# Patient Record
Sex: MALE | Race: OTHER | NOT HISPANIC OR LATINO | Employment: FULL TIME | URBAN - METROPOLITAN AREA
[De-identification: names, ages, dates, MRNs, and addresses within clinical notes are randomized per-mention and may not be internally consistent; named-entity substitution may affect disease eponyms.]

---

## 2022-03-07 ENCOUNTER — OFFICE VISIT (OUTPATIENT)
Dept: NEUROLOGY | Facility: CLINIC | Age: 57
End: 2022-03-07
Payer: COMMERCIAL

## 2022-03-07 ENCOUNTER — DOCUMENTATION ONLY (OUTPATIENT)
Dept: NEUROLOGY | Facility: CLINIC | Age: 57
End: 2022-03-07
Payer: COMMERCIAL

## 2022-03-07 VITALS — SYSTOLIC BLOOD PRESSURE: 135 MMHG | HEART RATE: 89 BPM | DIASTOLIC BLOOD PRESSURE: 86 MMHG | WEIGHT: 186.5 LBS

## 2022-03-07 DIAGNOSIS — R06.83 SNORING: ICD-10-CM

## 2022-03-07 DIAGNOSIS — R51.9 MORNING HEADACHE: ICD-10-CM

## 2022-03-07 DIAGNOSIS — G35 MULTIPLE SCLEROSIS: Primary | ICD-10-CM

## 2022-03-07 DIAGNOSIS — Z79.899 ENCOUNTER FOR MONITORING IMMUNOMODULATING THERAPY: ICD-10-CM

## 2022-03-07 DIAGNOSIS — Z51.81 ENCOUNTER FOR MONITORING IMMUNOMODULATING THERAPY: ICD-10-CM

## 2022-03-07 PROCEDURE — 99205 PR OFFICE/OUTPT VISIT, NEW, LEVL V, 60-74 MIN: ICD-10-PCS | Mod: S$GLB,,, | Performed by: PSYCHIATRY & NEUROLOGY

## 2022-03-07 PROCEDURE — 3079F PR MOST RECENT DIASTOLIC BLOOD PRESSURE 80-89 MM HG: ICD-10-PCS | Mod: CPTII,S$GLB,, | Performed by: PSYCHIATRY & NEUROLOGY

## 2022-03-07 PROCEDURE — 99999 PR PBB SHADOW E&M-NEW PATIENT-LVL IV: CPT | Mod: PBBFAC,,, | Performed by: PSYCHIATRY & NEUROLOGY

## 2022-03-07 PROCEDURE — 99205 OFFICE O/P NEW HI 60 MIN: CPT | Mod: S$GLB,,, | Performed by: PSYCHIATRY & NEUROLOGY

## 2022-03-07 PROCEDURE — 3079F DIAST BP 80-89 MM HG: CPT | Mod: CPTII,S$GLB,, | Performed by: PSYCHIATRY & NEUROLOGY

## 2022-03-07 PROCEDURE — 99999 PR PBB SHADOW E&M-NEW PATIENT-LVL IV: ICD-10-PCS | Mod: PBBFAC,,, | Performed by: PSYCHIATRY & NEUROLOGY

## 2022-03-07 PROCEDURE — 3075F SYST BP GE 130 - 139MM HG: CPT | Mod: CPTII,S$GLB,, | Performed by: PSYCHIATRY & NEUROLOGY

## 2022-03-07 PROCEDURE — 3075F PR MOST RECENT SYSTOLIC BLOOD PRESS GE 130-139MM HG: ICD-10-PCS | Mod: CPTII,S$GLB,, | Performed by: PSYCHIATRY & NEUROLOGY

## 2022-03-07 NOTE — PROGRESS NOTES
I saw Albert Savage as a new patient today to establish care for multiple sclerosis.  He comes in to establish care and is referred by Self, Zackal.  He is accompanied by his wife.     History of Present Illness  The patient is a 56 y.o. RH male with MS, here to establish care.      Previously seen by Dr. Woods. Last MRI and labs was in 2019. He doesn't have records or disks on today. Previously on Copaxone, had difficulty getting his meds due a mix up with another patient. Was on Copaxone.    Diagnosed in 2015 at [facility]. Initially with speech difficulty, wife describes speech as garbled and wasn't making sense. He was also tripping over his toes, unclear which foot or if both feet. Initially thought to be stroke, sent to ER. He had a CT head, which did not show stroke. MRI of brain demonstrated lesions down brain and spinal cord. He did have spinal tap. He was started on Copaxone, but this was stopped around 2021 as above.    He says that he thinks that he had symptoms prior to MS diagnosis, but no clear discrete episodes of relapse.    No clear new episodes of relapses, but wife notes that his memory is of question. He keeps asking the same questions. Sometimes with walking difficulty and with difficulty writing (actually took drafting in school).     No family history of MS.  Father had AIDP  Nephew has narcolepsy    Takes vit D3 2000unit/daily and multivitamin     Review of systems:    Vertigo/Dizziness: yes, vertigo with car rides   Headaches: yes, typically in early morning, sometimes in the evening.   Visual Symptoms: Not recently, but prior to 8/2005 he had some vision loss for several days   Bladder Dysfunction: No    Bowel Dysfunction: No    Pain: none    Skin Breakdown: No    Cognitive: some cognitive dysfunction.   Dysphagia: No    Dysarthria: No    Hand Dysfunction: Yes - decreased dexterity in terms of writing.   Gait Disturbance: Yes - at times unstable and infrequent  near falls    Falls: No   Spasticity: No    Sensation changes: no   Mood Disorder: No    Fatigue: Yes - when it's hot outside    Sleep Disturbance: yes, snoring at night and also wakes up with neck pain   Tremors: No    Heat sensitivity: Yes - but cold > heat sensitive    Any un-met adaptive needs? No    Copay Assist? Yes - for DMT      Past Medical History:   Diagnosis Date    Childhood asthma without complication        No past surgical history on file.    No family history on file.    Social History     Tobacco Use    Smoking status: Never Smoker    Smokeless tobacco: Never Used   Substance Use Topics    Alcohol use: Never    Drug use: Never       Review of patient's allergies indicates:   Allergen Reactions    Aspirin        No current outpatient medications on file prior to visit.         Physical Exam    /86   Pulse 89   Wt 84.6 kg (186 lb 8.2 oz)       In general, the patient is well nourished.    25 foot timed walk: deferred    MENTAL STATUS: language is fluent, normal verbal comprehension, wife helps to add to historical data, attention is normal, patient is alert and oriented x 3, fund of knowlege is appropriate by vocabulary.     CRANIAL NERVE EXAM:  There is no internuclear ophthalmoplegia.  Extraocular muscles are intact. Pupils are equal, round, and reactive to light. No facial asymmetry. Facial sensation is intact bilaterally. There is no dysarthria. Uvula is midline, and palate moves symmetrically. Shoulder shrug intact bilaterlly. Tongue protrusion is midline. Hearing is grossly intact. Neck is supple.     MOTOR EXAM: Normal bulk and tone throughout UE and LE bilaterally.    ? Delt Bi Tri WE WF FDI HF KE KF ADF APF   Right 5 5 5 5 5 5 5 5 5 5 5   Left 5 5 5 5 5 5 5 5 5 5 5       REFLEXES: 2+ and symmetric throughout except 1+ at achilles    SENSORY EXAM: Normal to light touch throughout.    COORDINATION: No upper extremity ataxia    GAIT: Narrow based and stable. Can walk on  toes        IMAGING (personally reviewed):  No results found for this or any previous visit.    No results found for this or any previous visit.    No results found for this or any previous visit.    No results found for this or any previous visit.    No results found for this or any previous visit.    No results found for this or any previous visit.      LABS:  No results found for: WBC, HGB, HCT, MCV, PLT      Chemistry    No results found for: NA, K, CL, CO2, BUN, CREATININE, GLU No results found for: CALCIUM, ALKPHOS, AST, ALT, BILITOT, ESTGFRAFRICA, EGFRNONAA         No results found for: LABPROT, ALBUMIN          Diagnosis/Assessment/Plan:       1. Multiple sclerosis  · Assessment: previously diagnosed with MS based on MRI and CSF but no chart documentation or disks available on today  · Imaging: will obtain old imaging and order new monitoring MRIs on today  · Labs: prescreening med monitoring labs ordered on today  · Symptoms management:   · Cognitive complaints: consider neuropsych referral at future visit  · Morning headaches: concern for sleep apnea, referral to sleep medicine. Discussed that this is an independent risk factor for stroke. Referral for sleep medicine ordered on today        Time spent on this encounter: 80 minutes. This includes face to face time and non-face to face time preparing to see the patient (eg, review of tests), obtaining and/or reviewing separately obtained history, documenting clinical information in the electronic or other health record, independently interpreting results and communicating results to the patient/family/caregiver, or care coordinator.      F/u in 3 months    Delia Aguirre MD

## 2022-03-18 ENCOUNTER — HOSPITAL ENCOUNTER (OUTPATIENT)
Dept: RADIOLOGY | Facility: HOSPITAL | Age: 57
Discharge: HOME | End: 2022-03-18
Attending: PSYCHIATRY & NEUROLOGY
Payer: COMMERCIAL

## 2022-03-18 DIAGNOSIS — Z79.899 ENCOUNTER FOR MONITORING IMMUNOMODULATING THERAPY: ICD-10-CM

## 2022-03-18 DIAGNOSIS — Z51.81 ENCOUNTER FOR MONITORING IMMUNOMODULATING THERAPY: ICD-10-CM

## 2022-03-18 DIAGNOSIS — G35 MULTIPLE SCLEROSIS: ICD-10-CM

## 2022-03-18 PROCEDURE — 72156 MRI NECK SPINE W/O & W/DYE: CPT | Mod: TC

## 2022-03-18 PROCEDURE — 72156 MRI CERVICAL SPINE DEMYELINATING W W/O CONTRAST: ICD-10-PCS | Mod: 26,,, | Performed by: RADIOLOGY

## 2022-03-18 PROCEDURE — 72157 MRI CHEST SPINE W/O & W/DYE: CPT | Mod: TC

## 2022-03-18 PROCEDURE — 25500020 PHARM REV CODE 255: Performed by: PSYCHIATRY & NEUROLOGY

## 2022-03-18 PROCEDURE — 70553 MRI BRAIN DEMYELINATING W/ WO CONTRAST: ICD-10-PCS | Mod: 26,,, | Performed by: RADIOLOGY

## 2022-03-18 PROCEDURE — 72156 MRI NECK SPINE W/O & W/DYE: CPT | Mod: 26,,, | Performed by: RADIOLOGY

## 2022-03-18 PROCEDURE — A9585 GADOBUTROL INJECTION: HCPCS | Performed by: PSYCHIATRY & NEUROLOGY

## 2022-03-18 PROCEDURE — 70553 MRI BRAIN STEM W/O & W/DYE: CPT | Mod: 26,,, | Performed by: RADIOLOGY

## 2022-03-18 PROCEDURE — 70553 MRI BRAIN STEM W/O & W/DYE: CPT | Mod: TC

## 2022-03-18 RX ORDER — GADOBUTROL 604.72 MG/ML
9 INJECTION INTRAVENOUS
Status: COMPLETED | OUTPATIENT
Start: 2022-03-18 | End: 2022-03-18

## 2022-03-18 RX ADMIN — GADOBUTROL 9 ML: 604.72 INJECTION INTRAVENOUS at 01:03

## 2022-05-09 ENCOUNTER — PATIENT MESSAGE (OUTPATIENT)
Dept: NEUROLOGY | Facility: CLINIC | Age: 57
End: 2022-05-09
Payer: COMMERCIAL

## 2022-06-06 ENCOUNTER — OFFICE VISIT (OUTPATIENT)
Dept: NEUROLOGY | Facility: CLINIC | Age: 57
End: 2022-06-06
Payer: COMMERCIAL

## 2022-06-06 VITALS
SYSTOLIC BLOOD PRESSURE: 134 MMHG | DIASTOLIC BLOOD PRESSURE: 79 MMHG | TEMPERATURE: 98 F | HEART RATE: 82 BPM | WEIGHT: 188.69 LBS

## 2022-06-06 DIAGNOSIS — G35 MULTIPLE SCLEROSIS: Primary | ICD-10-CM

## 2022-06-06 PROCEDURE — 1159F PR MEDICATION LIST DOCUMENTED IN MEDICAL RECORD: ICD-10-PCS | Mod: CPTII,S$GLB,, | Performed by: PSYCHIATRY & NEUROLOGY

## 2022-06-06 PROCEDURE — 99215 PR OFFICE/OUTPT VISIT, EST, LEVL V, 40-54 MIN: ICD-10-PCS | Mod: S$GLB,,, | Performed by: PSYCHIATRY & NEUROLOGY

## 2022-06-06 PROCEDURE — 1159F MED LIST DOCD IN RCRD: CPT | Mod: CPTII,S$GLB,, | Performed by: PSYCHIATRY & NEUROLOGY

## 2022-06-06 PROCEDURE — 3078F PR MOST RECENT DIASTOLIC BLOOD PRESSURE < 80 MM HG: ICD-10-PCS | Mod: CPTII,S$GLB,, | Performed by: PSYCHIATRY & NEUROLOGY

## 2022-06-06 PROCEDURE — 3078F DIAST BP <80 MM HG: CPT | Mod: CPTII,S$GLB,, | Performed by: PSYCHIATRY & NEUROLOGY

## 2022-06-06 PROCEDURE — 3075F SYST BP GE 130 - 139MM HG: CPT | Mod: CPTII,S$GLB,, | Performed by: PSYCHIATRY & NEUROLOGY

## 2022-06-06 PROCEDURE — 3075F PR MOST RECENT SYSTOLIC BLOOD PRESS GE 130-139MM HG: ICD-10-PCS | Mod: CPTII,S$GLB,, | Performed by: PSYCHIATRY & NEUROLOGY

## 2022-06-06 PROCEDURE — 99215 OFFICE O/P EST HI 40 MIN: CPT | Mod: S$GLB,,, | Performed by: PSYCHIATRY & NEUROLOGY

## 2022-06-06 PROCEDURE — 99999 PR PBB SHADOW E&M-EST. PATIENT-LVL III: CPT | Mod: PBBFAC,,, | Performed by: PSYCHIATRY & NEUROLOGY

## 2022-06-06 PROCEDURE — 99999 PR PBB SHADOW E&M-EST. PATIENT-LVL III: ICD-10-PCS | Mod: PBBFAC,,, | Performed by: PSYCHIATRY & NEUROLOGY

## 2022-06-06 NOTE — PROGRESS NOTES
Subjective:          Patient ID: Albert Savage is a 56 y.o. male who presents today for a routine clinic visit for MS.      MS HPI:  · DMT: None, would like start Copaxone as no ISRs with past use  · Side effects from DMT? No  · Taking vitamin D3 as recommended? yes  · No new symptoms  · Discussed MRI's, labs and treatment options    Medications:  No current outpatient medications on file prior to visit.        SOCIAL HISTORY  Social History     Tobacco Use    Smoking status: Never Smoker    Smokeless tobacco: Never Used   Substance Use Topics    Alcohol use: Never    Drug use: Never       ROS:    No flowsheet data found.             Objective:        1. 25 foot timed walk:  Timed 25 Foot Walk: 6/6/2022   Did patient wear an AFO? No   Was assistive device used? No   Time for 25 Foot Walk (seconds) 5.83   Time for 25 Foot Walk (seconds) 5.93       2. 9 Hole Peg Test:  No flowsheet data found.    Neurologic Exam  MENTAL STATUS: grossly intact. Normal language and attention.   CRANIAL NERVE EXAM: Extraocular muscles are intact.  No facial asymmetry. Shoulder shrug normal b/l, There is no dysarthria.   MOTOR EXAM: Normal bulk and tone throughout UE and LE bilaterally. Rapid sequential movements are normal. Strength is 5/5 in all groups in the lower extremities and upper extremities.   GAIT: Narrow based, stable.      Imaging:     No results found for this or any previous visit.    No results found for this or any previous visit.    No results found for this or any previous visit.    Results for orders placed during the hospital encounter of 03/18/22    MRI Brain Demyelinating W W/O Contrast    Impression  Numerous white matter lesions as discussed above consistent with the history of multiple sclerosis.  No enhancing lesion.  Please note there is no prior study for comparison.      Electronically signed by: Denver Arnold  Date:    03/20/2022  Time:    10:30    Results for orders placed during the hospital  encounter of 03/18/22    MRI Cervical Spine Demyelinating W W/O Contrast    Impression  Focal lesions within the cord at C3-4, C5-6, and T1 levels consistent with demyelinating disease.  No cord expansion or enhancement.  Relatively mild degenerative changes cervical spine.      Electronically signed by: Denver Arnold  Date:    03/20/2022  Time:    10:37    Results for orders placed during the hospital encounter of 03/18/22    MRI Thoracic Spine Demyelinating W W/O Contrast    Impression  Focal lesions within the cord at C3-4, C5-6, and T1 levels consistent with demyelinating disease.  No cord expansion or enhancement.  Relatively mild degenerative changes cervical spine.      Electronically signed by: Denver Arnold  Date:    03/20/2022  Time:    10:37        Labs:     No results found for: WDIWOZST95JF  No results found for: JCVINDEX, JCVANTIBODY  Lab Results   Component Value Date    YX0YDZOX 67.4 03/18/2022    ABSOLUTECD3 1388 03/18/2022    ON9SRZSX 15.7 03/18/2022    ABSOLUTECD8 324 03/18/2022    EH4TGXAB 50.1 03/18/2022    ABSOLUTECD4 1033 03/18/2022    LABCD48 3.19 03/18/2022     Lab Results   Component Value Date    WBC 7.49 03/18/2022    RBC 5.24 03/18/2022    HGB 15.5 03/18/2022    HCT 45.9 03/18/2022    MCV 88 03/18/2022    MCH 29.6 03/18/2022    MCHC 33.8 03/18/2022    RDW 12.0 03/18/2022     03/18/2022    MPV 12.9 03/18/2022    GRAN 4.8 03/18/2022    GRAN 64.5 03/18/2022    LYMPH 1.8 03/18/2022    LYMPH 23.9 03/18/2022    MONO 0.5 03/18/2022    MONO 6.3 03/18/2022    EOS 0.3 03/18/2022    BASO 0.06 03/18/2022    EOSINOPHIL 4.0 03/18/2022    BASOPHIL 0.8 03/18/2022     Sodium   Date Value Ref Range Status   03/18/2022 140 136 - 145 mmol/L Final     Potassium   Date Value Ref Range Status   03/18/2022 4.0 3.5 - 5.1 mmol/L Final     Chloride   Date Value Ref Range Status   03/18/2022 103 95 - 110 mmol/L Final     CO2   Date Value Ref Range Status   03/18/2022 28 23 - 29 mmol/L Final     Glucose    Date Value Ref Range Status   03/18/2022 112 (H) 70 - 110 mg/dL Final     BUN   Date Value Ref Range Status   03/18/2022 12 6 - 20 mg/dL Final     Creatinine   Date Value Ref Range Status   03/18/2022 1.1 0.5 - 1.4 mg/dL Final     Calcium   Date Value Ref Range Status   03/18/2022 9.9 8.7 - 10.5 mg/dL Final     Total Protein   Date Value Ref Range Status   03/18/2022 7.9 6.0 - 8.4 g/dL Final     Albumin   Date Value Ref Range Status   03/18/2022 4.3 3.5 - 5.2 g/dL Final     Total Bilirubin   Date Value Ref Range Status   03/18/2022 0.5 0.1 - 1.0 mg/dL Final     Comment:     For infants and newborns, interpretation of results should be based  on gestational age, weight and in agreement with clinical  observations.    Premature Infant recommended reference ranges:  Up to 24 hours.............<8.0 mg/dL  Up to 48 hours............<12.0 mg/dL  3-5 days..................<15.0 mg/dL  6-29 days.................<15.0 mg/dL       Alkaline Phosphatase   Date Value Ref Range Status   03/18/2022 53 (L) 55 - 135 U/L Final     AST   Date Value Ref Range Status   03/18/2022 22 10 - 40 U/L Final     ALT   Date Value Ref Range Status   03/18/2022 34 10 - 44 U/L Final     Anion Gap   Date Value Ref Range Status   03/18/2022 9 8 - 16 mmol/L Final     eGFR if    Date Value Ref Range Status   03/18/2022 >60.0 >60 mL/min/1.73 m^2 Final     eGFR if non    Date Value Ref Range Status   03/18/2022 >60.0 >60 mL/min/1.73 m^2 Final     Comment:     Calculation used to obtain the estimated glomerular filtration  rate (eGFR) is the CKD-EPI equation.        No results found for: HEPBSAG, HEPBSAB, HEPBCAB        MS Impression and Plan:        RRMS.  MRI and clinical history based diagnosis.  Previously on Copaxone. After discussing other options, he like to restart this given risk aversion with other DMTs and aversion to more frequent labs required with other DMT's. Will start at this time.  MS counseling  given.    F/u in 3 months    Time spent on this encounter: 50 minutes. This includes face to face time and non-face to face time preparing to see the patient (eg, review of tests), obtaining and/or reviewing separately obtained history, documenting clinical information in the electronic or other health record, independently interpreting results and communicating results to the patient/family/caregiver, or care coordinator.      Problem List Items Addressed This Visit    None         Delia Aguirre MD

## 2022-06-08 DIAGNOSIS — G35 MULTIPLE SCLEROSIS: Primary | ICD-10-CM

## 2022-06-09 RX ORDER — GLATIRAMER 40 MG/ML
40 INJECTION, SOLUTION SUBCUTANEOUS
Qty: 12 ML | Refills: 5 | Status: SHIPPED | OUTPATIENT
Start: 2022-06-10 | End: 2022-10-07 | Stop reason: SDUPTHER

## 2022-06-22 ENCOUNTER — PATIENT MESSAGE (OUTPATIENT)
Dept: PHARMACY | Facility: CLINIC | Age: 57
End: 2022-06-22
Payer: COMMERCIAL

## 2022-06-22 ENCOUNTER — SPECIALTY PHARMACY (OUTPATIENT)
Dept: PHARMACY | Facility: CLINIC | Age: 57
End: 2022-06-22
Payer: COMMERCIAL

## 2022-06-22 NOTE — TELEPHONE ENCOUNTER
Glatiramer PA on file from 12/14/21 to 12/14/22  Case ID: [id number]    Patient locked into filling with [facility] Rx routed. Attempted to reach patient to inform him, but someone answered the phone then hung up. Simfinit message sent. Closing referral at [facility].

## 2022-08-17 ENCOUNTER — TELEPHONE (OUTPATIENT)
Dept: NEUROLOGY | Facility: CLINIC | Age: 57
End: 2022-08-17
Payer: COMMERCIAL

## 2022-10-10 ENCOUNTER — TELEPHONE (OUTPATIENT)
Dept: NEUROLOGY | Facility: CLINIC | Age: 57
End: 2022-10-10
Payer: COMMERCIAL

## 2022-10-10 ENCOUNTER — OCCUPATIONAL HEALTH (OUTPATIENT)
Dept: URGENT CARE | Facility: CLINIC | Age: 57
End: 2022-10-10
Payer: COMMERCIAL

## 2022-10-10 DIAGNOSIS — Z00.00 ENCOUNTER FOR PHYSICAL EXAMINATION: Primary | ICD-10-CM

## 2022-10-10 PROCEDURE — 80305 DRUG TEST PRSMV DIR OPT OBS: CPT | Mod: S$GLB,,, | Performed by: NURSE PRACTITIONER

## 2022-10-10 PROCEDURE — 80305 OOH COLLECTION ONLY DRUG SCREEN: ICD-10-PCS | Mod: S$GLB,,, | Performed by: NURSE PRACTITIONER

## 2022-10-10 PROCEDURE — 97750 LIFT TEST: ICD-10-PCS | Mod: S$GLB,,, | Performed by: NURSE PRACTITIONER

## 2022-10-10 PROCEDURE — 97750 PHYSICAL PERFORMANCE TEST: CPT | Mod: S$GLB,,, | Performed by: NURSE PRACTITIONER

## 2022-10-10 PROCEDURE — 99499 UNLISTED E&M SERVICE: CPT | Mod: S$GLB,,, | Performed by: NURSE PRACTITIONER

## 2022-10-10 PROCEDURE — 99499 PHYSICAL, BASIC COMPLEXITY: ICD-10-PCS | Mod: S$GLB,,, | Performed by: NURSE PRACTITIONER

## 2022-11-18 ENCOUNTER — TELEPHONE (OUTPATIENT)
Dept: NEUROLOGY | Facility: CLINIC | Age: 57
End: 2022-11-18
Payer: COMMERCIAL

## 2022-11-18 NOTE — TELEPHONE ENCOUNTER
----- Message from Kaley Florez MA sent at 11/14/2022  1:23 PM CST -----  Regarding: pt advice  Contact: pt   Patient called stated that [facility]  will be calling to update patient information, he stated that he is getting a new insurance card in the mail and do not have the ID cards on hand. He just wanted to notify the office.

## 2022-11-21 ENCOUNTER — TELEPHONE (OUTPATIENT)
Dept: NEUROLOGY | Facility: CLINIC | Age: 57
End: 2022-11-21
Payer: COMMERCIAL

## 2022-11-21 DIAGNOSIS — G35 MULTIPLE SCLEROSIS: ICD-10-CM

## 2022-11-21 NOTE — TELEPHONE ENCOUNTER
----- Message from Vani Mckeon sent at 11/21/2022  1:56 PM CST -----  Regarding: APPT  Contact: pt [phone]  Rx Refill/Request    Is this a Refill or New Rx:  refill    Rx Name and Strength:  glatiramer (COPAXONE, GLATOPA) 40 mg/mL injection 12 mL     Preferred Pharmacy with phone number:    [facility]     Communication Preference:    Additional Information: needs prior auth

## 2022-11-29 RX ORDER — GLATIRAMER 40 MG/ML
40 INJECTION, SOLUTION SUBCUTANEOUS
Qty: 12 ML | Refills: 5 | Status: SHIPPED | OUTPATIENT
Start: 2022-11-30 | End: 2023-05-15

## 2022-11-30 ENCOUNTER — SPECIALTY PHARMACY (OUTPATIENT)
Dept: PHARMACY | Facility: CLINIC | Age: 57
End: 2022-11-30
Payer: COMMERCIAL

## 2022-11-30 NOTE — TELEPHONE ENCOUNTER
Tyler HARRISON submitted 11/30/22  M Key: [id number]    Hello, this is Joe Galloway with [facility].  We are working on your prescription that your doctor has sent us. We will be working with your insurance to get this approved for you. We will be calling you along the way with updates on your medication.  If you have any questions, you can reach us at [phone].    Welcome call outcome: Patient/caregiver reached

## 2022-12-01 ENCOUNTER — PATIENT MESSAGE (OUTPATIENT)
Dept: PHARMACY | Facility: CLINIC | Age: 57
End: 2022-12-01
Payer: COMMERCIAL

## 2022-12-01 NOTE — TELEPHONE ENCOUNTER
Glatiramer PA approved 11/30/22 to 11/30/23  Request Reference Number: [id number]    Patient locked into filling with [facility] to provide phone number to schedule delivery ([phone]). Closing referral at [facility].

## 2023-01-06 ENCOUNTER — OCCUPATIONAL HEALTH (OUTPATIENT)
Dept: URGENT CARE | Facility: CLINIC | Age: 58
End: 2023-01-06

## 2023-01-06 DIAGNOSIS — Z02.83 ENCOUNTER FOR DRUG SCREENING: Primary | ICD-10-CM

## 2023-01-06 DIAGNOSIS — Z13.9 ENCOUNTER FOR SCREENING: Primary | ICD-10-CM

## 2023-01-06 LAB
BREATH ALCOHOL: 0
CTP QC/QA: YES
POC 10 PANEL DRUG SCREEN: ABNORMAL

## 2023-01-06 PROCEDURE — 82075 POCT BREATH ALCOHOL TEST: ICD-10-PCS | Mod: S$GLB,,, | Performed by: PHYSICIAN ASSISTANT

## 2023-01-06 PROCEDURE — 80305 DRUG TEST PRSMV DIR OPT OBS: CPT | Mod: S$GLB,,, | Performed by: PHYSICIAN ASSISTANT

## 2023-01-06 PROCEDURE — 80305 MEDTOX HAIR COLLECTION ONLY: ICD-10-PCS | Mod: S$GLB,,, | Performed by: PHYSICIAN ASSISTANT

## 2023-01-06 PROCEDURE — 82075 ASSAY OF BREATH ETHANOL: CPT | Mod: S$GLB,,, | Performed by: PHYSICIAN ASSISTANT

## 2023-01-06 PROCEDURE — 80305 OOH NON-DOT DRUG SCREEN: ICD-10-PCS | Mod: S$GLB,,, | Performed by: PHYSICIAN ASSISTANT

## 2023-03-01 ENCOUNTER — OFFICE VISIT (OUTPATIENT)
Dept: NEUROLOGY | Facility: CLINIC | Age: 58
End: 2023-03-01
Payer: COMMERCIAL

## 2023-03-01 VITALS
SYSTOLIC BLOOD PRESSURE: 135 MMHG | BODY MASS INDEX: 27.53 KG/M2 | HEIGHT: 69 IN | WEIGHT: 185.88 LBS | HEART RATE: 72 BPM | DIASTOLIC BLOOD PRESSURE: 89 MMHG

## 2023-03-01 DIAGNOSIS — G35 MULTIPLE SCLEROSIS: Primary | ICD-10-CM

## 2023-03-01 DIAGNOSIS — Z29.89 PROPHYLACTIC IMMUNOTHERAPY: ICD-10-CM

## 2023-03-01 DIAGNOSIS — Z71.89 COUNSELING REGARDING GOALS OF CARE: ICD-10-CM

## 2023-03-01 PROCEDURE — 3079F PR MOST RECENT DIASTOLIC BLOOD PRESSURE 80-89 MM HG: ICD-10-PCS | Mod: CPTII,S$GLB,, | Performed by: PSYCHIATRY & NEUROLOGY

## 2023-03-01 PROCEDURE — 99215 OFFICE O/P EST HI 40 MIN: CPT | Mod: S$GLB,,, | Performed by: PSYCHIATRY & NEUROLOGY

## 2023-03-01 PROCEDURE — 4010F PR ACE/ARB THEARPY RXD/TAKEN: ICD-10-PCS | Mod: CPTII,S$GLB,, | Performed by: PSYCHIATRY & NEUROLOGY

## 2023-03-01 PROCEDURE — 1160F RVW MEDS BY RX/DR IN RCRD: CPT | Mod: CPTII,S$GLB,, | Performed by: PSYCHIATRY & NEUROLOGY

## 2023-03-01 PROCEDURE — 99999 PR PBB SHADOW E&M-EST. PATIENT-LVL III: CPT | Mod: PBBFAC,,, | Performed by: PSYCHIATRY & NEUROLOGY

## 2023-03-01 PROCEDURE — 1159F PR MEDICATION LIST DOCUMENTED IN MEDICAL RECORD: ICD-10-PCS | Mod: CPTII,S$GLB,, | Performed by: PSYCHIATRY & NEUROLOGY

## 2023-03-01 PROCEDURE — 3075F PR MOST RECENT SYSTOLIC BLOOD PRESS GE 130-139MM HG: ICD-10-PCS | Mod: CPTII,S$GLB,, | Performed by: PSYCHIATRY & NEUROLOGY

## 2023-03-01 PROCEDURE — 99215 PR OFFICE/OUTPT VISIT, EST, LEVL V, 40-54 MIN: ICD-10-PCS | Mod: S$GLB,,, | Performed by: PSYCHIATRY & NEUROLOGY

## 2023-03-01 PROCEDURE — 3079F DIAST BP 80-89 MM HG: CPT | Mod: CPTII,S$GLB,, | Performed by: PSYCHIATRY & NEUROLOGY

## 2023-03-01 PROCEDURE — 3008F BODY MASS INDEX DOCD: CPT | Mod: CPTII,S$GLB,, | Performed by: PSYCHIATRY & NEUROLOGY

## 2023-03-01 PROCEDURE — 1159F MED LIST DOCD IN RCRD: CPT | Mod: CPTII,S$GLB,, | Performed by: PSYCHIATRY & NEUROLOGY

## 2023-03-01 PROCEDURE — 3075F SYST BP GE 130 - 139MM HG: CPT | Mod: CPTII,S$GLB,, | Performed by: PSYCHIATRY & NEUROLOGY

## 2023-03-01 PROCEDURE — 3008F PR BODY MASS INDEX (BMI) DOCUMENTED: ICD-10-PCS | Mod: CPTII,S$GLB,, | Performed by: PSYCHIATRY & NEUROLOGY

## 2023-03-01 PROCEDURE — 1160F PR REVIEW ALL MEDS BY PRESCRIBER/CLIN PHARMACIST DOCUMENTED: ICD-10-PCS | Mod: CPTII,S$GLB,, | Performed by: PSYCHIATRY & NEUROLOGY

## 2023-03-01 PROCEDURE — 4010F ACE/ARB THERAPY RXD/TAKEN: CPT | Mod: CPTII,S$GLB,, | Performed by: PSYCHIATRY & NEUROLOGY

## 2023-03-01 PROCEDURE — 99999 PR PBB SHADOW E&M-EST. PATIENT-LVL III: ICD-10-PCS | Mod: PBBFAC,,, | Performed by: PSYCHIATRY & NEUROLOGY

## 2023-03-01 NOTE — PROGRESS NOTES
Subjective:          Patient ID: Albert Savage Jr. is a 57 y.o. male who presents today for a routine clinic visit for MS.  He is accompanied by his wife.      MS HPI:  DMT: glatiramer acetate  Side effects from DMT? No   Taking vitamin D3 as recommended? Yes -  5,000 IU/day   Restarted Glatiramer only recently -- only been back on it two months or so.   Denies any pain.   Denies any vertigo or balance issues.   Denies any sense of relapse or progressive.   As for permanent disability, his handwriting has changed for the worse, and he has some slurred speech according to his wife.    No other symptoms of concern; bladder is okay;  no spasms;   He is getting copay assistance  Mood is irritable    Medications:  Current Outpatient Medications   Medication Sig    glatiramer (COPAXONE, GLATOPA) 40 mg/mL injection Inject 40 mg into the skin 3 (three) times a week.     No current facility-administered medications for this visit.       SOCIAL HISTORY  Social History     Tobacco Use    Smoking status: Never    Smokeless tobacco: Never   Substance Use Topics    Alcohol use: Never    Drug use: Never       Living arrangements - the patient lives with their spouse.  He has 3 grown stepchildren whom he does not live with   Works as a            Objective:        Timed 25 Foot Walk: 6/6/2022 3/1/2023   Did patient wear an AFO? No No   Was assistive device used? No No   Time for 25 Foot Walk (seconds) 5.83 5   Time for 25 Foot Walk (seconds) 5.93 -     Neurologic Exam  MENTAL STATUS: grossly intact  CRANIAL NERVE EXAM: There is no internuclear ophthalmoplegia. Extraocular   muscles are intact.  No facial   asymmetry.There is no dysarthria.   MOTOR EXAM: Normal bulk and tone throughout UE and LE bilaterally. Rapid sequential movements are normal. Strength is 5/5 in all groups   in the lower extremities and upper extremities.   REFLEXES: Symmetric and 2+ throughout in all 4 extremities.   SENSORY EXAM: Normal to  vibration t/o  COORDINATION: Normal finger-to-nose exam.   GAIT: Narrow based and stable.      Imaging:     No results found for this or any previous visit.      Results for orders placed during the hospital encounter of 03/18/22    MRI Brain Demyelinating W W/O Contrast    Impression  Numerous white matter lesions as discussed above consistent with the history of multiple sclerosis.  No enhancing lesion.  Please note there is no prior study for comparison.      Electronically signed by: Denver Arnold  Date:    03/20/2022  Time:    10:30    Results for orders placed during the hospital encounter of 03/18/22    MRI Cervical Spine Demyelinating W W/O Contrast    Impression  Focal lesions within the cord at C3-4, C5-6, and T1 levels consistent with demyelinating disease.  No cord expansion or enhancement.  Relatively mild degenerative changes cervical spine.      Electronically signed by: Denver Arnold  Date:    03/20/2022  Time:    10:37    Results for orders placed during the hospital encounter of 03/18/22    MRI Thoracic Spine Demyelinating W W/O Contrast    Impression  Focal lesions within the cord at C3-4, C5-6, and T1 levels consistent with demyelinating disease.  No cord expansion or enhancement.  Relatively mild degenerative changes cervical spine.      Electronically signed by: Denver Arnold  Date:    03/20/2022  Time:    10:37        Labs:     No results found for: [id number]  No results found for: JCVINDEX, JCVANTIBODY  Lab Results   Component Value Date    KD3TMJSU 67.4 03/18/2022    ABSOLUTECD3 1388 03/18/2022    MA4AUQXU 15.7 03/18/2022    ABSOLUTECD8 324 03/18/2022    KW6KLURM 50.1 03/18/2022    ABSOLUTECD4 1033 03/18/2022    LABCD48 3.19 03/18/2022     Lab Results   Component Value Date    WBC 7.49 03/18/2022    RBC 5.24 03/18/2022    HGB 15.5 03/18/2022    HCT 45.9 03/18/2022    MCV 88 03/18/2022    MCH 29.6 03/18/2022    MCHC 33.8 03/18/2022    RDW 12.0 03/18/2022     03/18/2022    MPV  12.9 03/18/2022    GRAN 4.8 03/18/2022    GRAN 64.5 03/18/2022    LYMPH 1.8 03/18/2022    LYMPH 23.9 03/18/2022    MONO 0.5 03/18/2022    MONO 6.3 03/18/2022    EOS 0.3 03/18/2022    BASO 0.06 03/18/2022    EOSINOPHIL 4.0 03/18/2022    BASOPHIL 0.8 03/18/2022     Sodium   Date Value Ref Range Status   03/18/2022 140 136 - 145 mmol/L Final     Potassium   Date Value Ref Range Status   03/18/2022 4.0 3.5 - 5.1 mmol/L Final     Chloride   Date Value Ref Range Status   03/18/2022 103 95 - 110 mmol/L Final     CO2   Date Value Ref Range Status   03/18/2022 28 23 - 29 mmol/L Final     Glucose   Date Value Ref Range Status   03/18/2022 112 (H) 70 - 110 mg/dL Final     BUN   Date Value Ref Range Status   03/18/2022 12 6 - 20 mg/dL Final     Creatinine   Date Value Ref Range Status   03/18/2022 1.1 0.5 - 1.4 mg/dL Final     Calcium   Date Value Ref Range Status   03/18/2022 9.9 8.7 - 10.5 mg/dL Final     Total Protein   Date Value Ref Range Status   03/18/2022 7.9 6.0 - 8.4 g/dL Final     Albumin   Date Value Ref Range Status   03/18/2022 4.3 3.5 - 5.2 g/dL Final     Total Bilirubin   Date Value Ref Range Status   03/18/2022 0.5 0.1 - 1.0 mg/dL Final     Comment:     For infants and newborns, interpretation of results should be based  on gestational age, weight and in agreement with clinical  observations.    Premature Infant recommended reference ranges:  Up to 24 hours.............<8.0 mg/dL  Up to 48 hours............<12.0 mg/dL  3-5 days..................<15.0 mg/dL  6-29 days.................<15.0 mg/dL       Alkaline Phosphatase   Date Value Ref Range Status   03/18/2022 53 (L) 55 - 135 U/L Final     AST   Date Value Ref Range Status   03/18/2022 22 10 - 40 U/L Final     ALT   Date Value Ref Range Status   03/18/2022 34 10 - 44 U/L Final     Anion Gap   Date Value Ref Range Status   03/18/2022 9 8 - 16 mmol/L Final     eGFR if    Date Value Ref Range Status   03/18/2022 >60.0 >60 mL/min/1.73 m^2 Final      eGFR if non    Date Value Ref Range Status   03/18/2022 >60.0 >60 mL/min/1.73 m^2 Final     Comment:     Calculation used to obtain the estimated glomerular filtration  rate (eGFR) is the CKD-EPI equation.        No results found for: HEPBSAG, HEPBSAB, HEPBCAB        MS Impression and Plan:     NEURO MULTIPLE SCLEROSIS IMPRESSION:   MS Status:     Clinical Progression:  Clinically Stable    MRI Progression:  N/A  Plan:     DMT:  No change in management    Symptom Management:  No change in symptom management     Pt is clinically stable  He does not like needles, but wishes to stay the course with GA for now  6 mo interval MRIs of brain and spine in July   F/u [facility] in July after MRIs         Problem List Items Addressed This Visit    None  Visit Diagnoses       Multiple sclerosis    -  Primary    Relevant Orders    MRI Brain Demyelinating W W/O Contrast    MRI Cervical Spine Demyelinating W W/O Contrast    Prophylactic immunotherapy        Counseling regarding goals of care                Kami Faith MD    I spent a total of 40 minutes on the day of the visit.This includes face to face time and non-face to face time preparing to see the patient (eg, review of tests), obtaining and/or reviewing separately obtained history, documenting clinical information in the electronic or other health record, independently interpreting results and communicating results to the patient/family/caregiver, or care coordinator.

## 2023-05-15 DIAGNOSIS — G35 MULTIPLE SCLEROSIS: ICD-10-CM

## 2023-05-15 RX ORDER — GLATIRAMER 40 MG/ML
INJECTION, SOLUTION SUBCUTANEOUS
Qty: 12 ML | Refills: 11 | Status: SHIPPED | OUTPATIENT
Start: 2023-05-15

## 2023-07-11 ENCOUNTER — PATIENT MESSAGE (OUTPATIENT)
Dept: NEUROLOGY | Facility: CLINIC | Age: 58
End: 2023-07-11
Payer: COMMERCIAL

## 2023-07-17 ENCOUNTER — HOSPITAL ENCOUNTER (OUTPATIENT)
Dept: RADIOLOGY | Facility: HOSPITAL | Age: 58
Discharge: HOME | End: 2023-07-17
Attending: PSYCHIATRY & NEUROLOGY
Payer: COMMERCIAL

## 2023-07-17 DIAGNOSIS — G35 MULTIPLE SCLEROSIS: ICD-10-CM

## 2023-07-17 PROCEDURE — 25500020 PHARM REV CODE 255: Performed by: PSYCHIATRY & NEUROLOGY

## 2023-07-17 PROCEDURE — 70553 MRI BRAIN STEM W/O & W/DYE: CPT | Mod: TC

## 2023-07-17 PROCEDURE — A9585 GADOBUTROL INJECTION: HCPCS | Performed by: PSYCHIATRY & NEUROLOGY

## 2023-07-17 PROCEDURE — 70553 MRI BRAIN STEM W/O & W/DYE: CPT | Mod: 26,,, | Performed by: RADIOLOGY

## 2023-07-17 PROCEDURE — 70553 MRI BRAIN DEMYELINATING W/ WO CONTRAST: ICD-10-PCS | Mod: 26,,, | Performed by: RADIOLOGY

## 2023-07-17 RX ORDER — GADOBUTROL 604.72 MG/ML
9 INJECTION INTRAVENOUS
Status: COMPLETED | OUTPATIENT
Start: 2023-07-17 | End: 2023-07-17

## 2023-07-17 RX ADMIN — GADOBUTROL 9 ML: 604.72 INJECTION INTRAVENOUS at 08:07

## 2023-07-18 ENCOUNTER — OFFICE VISIT (OUTPATIENT)
Dept: NEUROLOGY | Facility: CLINIC | Age: 58
End: 2023-07-18
Payer: COMMERCIAL

## 2023-07-18 VITALS
SYSTOLIC BLOOD PRESSURE: 126 MMHG | HEIGHT: 69 IN | HEART RATE: 73 BPM | WEIGHT: 185.5 LBS | DIASTOLIC BLOOD PRESSURE: 85 MMHG | BODY MASS INDEX: 27.47 KG/M2

## 2023-07-18 DIAGNOSIS — G35 MULTIPLE SCLEROSIS: Primary | ICD-10-CM

## 2023-07-18 DIAGNOSIS — Z71.89 COUNSELING REGARDING GOALS OF CARE: ICD-10-CM

## 2023-07-18 DIAGNOSIS — Z29.89 PROPHYLACTIC IMMUNOTHERAPY: ICD-10-CM

## 2023-07-18 PROCEDURE — 4010F ACE/ARB THERAPY RXD/TAKEN: CPT | Mod: CPTII,S$GLB,, | Performed by: CLINICAL NURSE SPECIALIST

## 2023-07-18 PROCEDURE — 3079F DIAST BP 80-89 MM HG: CPT | Mod: CPTII,S$GLB,, | Performed by: CLINICAL NURSE SPECIALIST

## 2023-07-18 PROCEDURE — 1159F MED LIST DOCD IN RCRD: CPT | Mod: CPTII,S$GLB,, | Performed by: CLINICAL NURSE SPECIALIST

## 2023-07-18 PROCEDURE — 3074F PR MOST RECENT SYSTOLIC BLOOD PRESSURE < 130 MM HG: ICD-10-PCS | Mod: CPTII,S$GLB,, | Performed by: CLINICAL NURSE SPECIALIST

## 2023-07-18 PROCEDURE — 4010F PR ACE/ARB THEARPY RXD/TAKEN: ICD-10-PCS | Mod: CPTII,S$GLB,, | Performed by: CLINICAL NURSE SPECIALIST

## 2023-07-18 PROCEDURE — 3079F PR MOST RECENT DIASTOLIC BLOOD PRESSURE 80-89 MM HG: ICD-10-PCS | Mod: CPTII,S$GLB,, | Performed by: CLINICAL NURSE SPECIALIST

## 2023-07-18 PROCEDURE — 3074F SYST BP LT 130 MM HG: CPT | Mod: CPTII,S$GLB,, | Performed by: CLINICAL NURSE SPECIALIST

## 2023-07-18 PROCEDURE — 99215 PR OFFICE/OUTPT VISIT, EST, LEVL V, 40-54 MIN: ICD-10-PCS | Mod: S$GLB,,, | Performed by: CLINICAL NURSE SPECIALIST

## 2023-07-18 PROCEDURE — 1159F PR MEDICATION LIST DOCUMENTED IN MEDICAL RECORD: ICD-10-PCS | Mod: CPTII,S$GLB,, | Performed by: CLINICAL NURSE SPECIALIST

## 2023-07-18 PROCEDURE — 3008F BODY MASS INDEX DOCD: CPT | Mod: CPTII,S$GLB,, | Performed by: CLINICAL NURSE SPECIALIST

## 2023-07-18 PROCEDURE — 3008F PR BODY MASS INDEX (BMI) DOCUMENTED: ICD-10-PCS | Mod: CPTII,S$GLB,, | Performed by: CLINICAL NURSE SPECIALIST

## 2023-07-18 PROCEDURE — 99999 PR PBB SHADOW E&M-EST. PATIENT-LVL III: CPT | Mod: PBBFAC,,, | Performed by: CLINICAL NURSE SPECIALIST

## 2023-07-18 PROCEDURE — 99999 PR PBB SHADOW E&M-EST. PATIENT-LVL III: ICD-10-PCS | Mod: PBBFAC,,, | Performed by: CLINICAL NURSE SPECIALIST

## 2023-07-18 PROCEDURE — 99215 OFFICE O/P EST HI 40 MIN: CPT | Mod: S$GLB,,, | Performed by: CLINICAL NURSE SPECIALIST

## 2023-07-18 NOTE — PROGRESS NOTES
Subjective:          Patient ID: Albert Savage Jr. is a 57 y.o. male who presents today for a routine clinic visit for MS.  He was last seen by Dr. Faith in March 2023. The history has been provided by the patient. He is accompanied by his wife.     MS HPI:  DMT: Copaxone   Side effects from DMT? Does not like needles, but tolerates the injections well.   Taking vitamin D3 as recommended? 6000 units daily     He denies any new or worsening MS symptoms or signs of relapse.   He works full-time as a . He has a fairly active job.   He continues to have some trouble with handwriting, but this does not impact work much. He has no issues typing.   He denies any recent infections.   He had his eyes checked yesterday.   He is more sensitive to cold than heat.   He has headaches at times.   He does not drink alcohol or smoke and drinks a lot of water.     Medications:  Current Outpatient Medications   Medication Sig    glatiramer (COPAXONE, GLATOPA) 40 mg/mL injection INJECT 40MG SUBCUTANEOUSLY THREE TIMES WEEKLY AT LEAST 48 HOURS  APART     SOCIAL HISTORY  Social History     Tobacco Use    Smoking status: Never    Smokeless tobacco: Never   Substance Use Topics    Alcohol use: Never    Drug use: Never       Living arrangements - the patient lives with his wife     ROS:  Fatigue: No issues   Sleep: Snores at times   Bladder: Had 1 bladder accident about 2 months ago; will monitor   Bowels: Regular   Spasms: none   Vision: has bifocals   Cognition: his wife reports that he repeats himself sometimes   Mood: feels down sometimes regarding diagnosis  Walking: he is off balance sometimes; he does not fall   Pain: none   Swallowing: none   Speech: this has been an issue all of his life; he stumbles over words sometimes   Copay Assist: does not pay OOP for injections   Parethesias:  no issues          Objective:        1. 25 foot timed walk:  Timed 25 Foot Walk: 3/1/2023 7/18/2023   Did patient wear an AFO?  No No   Was assistive device used? No No   Time for 25 Foot Walk (seconds) 5 5.1   Time for 25 Foot Walk (seconds) - 4.6       Neurologic Exam    MENTAL STATUS: grossly intact  CRANIAL NERVE EXAM: There is no internuclear ophthalmoplegia. Extraocular muscles are intact.  No facial asymmetry.There is no dysarthria.   MOTOR EXAM: Normal bulk and tone throughout UE and LE bilaterally. Rapid sequential movements are normal in UE and LE. Strength is 5/5 in all groups in the lower extremities and upper extremities.   REFLEXES: Symmetric and 2+ throughout in all 4 extremities.   SENSORY EXAM: Normal to vibration t/o  COORDINATION: Normal finger-to-nose exam and heel to shin.   GAIT: Narrow based and stable. He can walk on toes and heels. He is unable to hop on each foot. Tandem walking is relatively steady.      Imaging:         Results for orders placed during the hospital encounter of 07/17/23    MRI Brain Demyelinating W W/O Contrast    Impression  Brain appears stable from prior exam, again demonstrating findings compatible with the reported history of multiple sclerosis.  No new or enhancing lesions to indicate ongoing or active demyelination.    Electronically signed by resident: Hyun Tapia  Date:    07/17/2023  Time:    11:00    Electronically signed by: Baltazar Reeder MD  Date:    07/17/2023  Time:    12:00    Images reviewed with the patient.     Labs:     Lab Results   Component Value Date    ZO5DCNJY 67.4 03/18/2022    ABSOLUTECD3 1388 03/18/2022    QQ5QOWJM 15.7 03/18/2022    ABSOLUTECD8 324 03/18/2022    OT5KSIGB 50.1 03/18/2022    ABSOLUTECD4 1033 03/18/2022    LABCD48 3.19 03/18/2022     Lab Results   Component Value Date    WBC 7.49 03/18/2022    RBC 5.24 03/18/2022    HGB 15.5 03/18/2022    HCT 45.9 03/18/2022    MCV 88 03/18/2022    MCH 29.6 03/18/2022    MCHC 33.8 03/18/2022    RDW 12.0 03/18/2022     03/18/2022    MPV 12.9 03/18/2022    GRAN 4.8 03/18/2022    GRAN 64.5 03/18/2022    LYMPH  1.8 03/18/2022    LYMPH 23.9 03/18/2022    MONO 0.5 03/18/2022    MONO 6.3 03/18/2022    EOS 0.3 03/18/2022    BASO 0.06 03/18/2022    EOSINOPHIL 4.0 03/18/2022    BASOPHIL 0.8 03/18/2022     Sodium   Date Value Ref Range Status   03/18/2022 140 136 - 145 mmol/L Final     Potassium   Date Value Ref Range Status   03/18/2022 4.0 3.5 - 5.1 mmol/L Final     Chloride   Date Value Ref Range Status   03/18/2022 103 95 - 110 mmol/L Final     CO2   Date Value Ref Range Status   03/18/2022 28 23 - 29 mmol/L Final     Glucose   Date Value Ref Range Status   03/18/2022 112 (H) 70 - 110 mg/dL Final     BUN   Date Value Ref Range Status   03/18/2022 12 6 - 20 mg/dL Final     Creatinine   Date Value Ref Range Status   03/18/2022 1.1 0.5 - 1.4 mg/dL Final     Calcium   Date Value Ref Range Status   03/18/2022 9.9 8.7 - 10.5 mg/dL Final     Total Protein   Date Value Ref Range Status   03/18/2022 7.9 6.0 - 8.4 g/dL Final     Albumin   Date Value Ref Range Status   03/18/2022 4.3 3.5 - 5.2 g/dL Final     Total Bilirubin   Date Value Ref Range Status   03/18/2022 0.5 0.1 - 1.0 mg/dL Final     Comment:     For infants and newborns, interpretation of results should be based  on gestational age, weight and in agreement with clinical  observations.    Premature Infant recommended reference ranges:  Up to 24 hours.............<8.0 mg/dL  Up to 48 hours............<12.0 mg/dL  3-5 days..................<15.0 mg/dL  6-29 days.................<15.0 mg/dL       Alkaline Phosphatase   Date Value Ref Range Status   03/18/2022 53 (L) 55 - 135 U/L Final     AST   Date Value Ref Range Status   03/18/2022 22 10 - 40 U/L Final     ALT   Date Value Ref Range Status   03/18/2022 34 10 - 44 U/L Final     Anion Gap   Date Value Ref Range Status   03/18/2022 9 8 - 16 mmol/L Final     eGFR if    Date Value Ref Range Status   03/18/2022 >60.0 >60 mL/min/1.73 m^2 Final     eGFR if non    Date Value Ref Range Status    03/18/2022 >60.0 >60 mL/min/1.73 m^2 Final     Comment:     Calculation used to obtain the estimated glomerular filtration  rate (eGFR) is the CKD-EPI equation.          MS Impression and Plan:     NEURO MULTIPLE SCLEROSIS IMPRESSION:   MS Status:     Number of relapses in the past year?:  0    Clinical Progression:  Clinically Stable    MRI Progression:  Stable  Plan:     DMT:  No change in management    DMT comment:  Continue Copaxone and Vitamin D. Vitamin D level ordered today.        He will follow up with Dr. Faith in 6 months.     Total time spent with patient: 45 minutes   Total time spent on encounter: 57 minutes           JULIO CÉSAR Medellin, CNS

## 2023-08-19 ENCOUNTER — PATIENT MESSAGE (OUTPATIENT)
Dept: NEUROLOGY | Facility: CLINIC | Age: 58
End: 2023-08-19
Payer: COMMERCIAL

## 2023-08-19 DIAGNOSIS — N39.0 URINARY TRACT INFECTION WITHOUT HEMATURIA, SITE UNSPECIFIED: ICD-10-CM

## 2023-08-19 DIAGNOSIS — G35 MULTIPLE SCLEROSIS: Primary | ICD-10-CM

## 2023-08-19 NOTE — LETTER
Samir [facility]  [facility] [address]  [address]  [facility] [address]  Phone: [phone]     2023     Re: Albert WEAVER Jr Hussein (: 1965)                       To Whom It May Concern:     Mr. Savage is a patient under my care. Please excuse him from work on the following dates as he was/will be attending medically necessary appointments. Feel free to reach out at [phone] should you have questions.    2023 lab appointment    9/15/2023 follow up lab appointment    2024 office visit     Sincerely,          Leidy Fernandez, JULIO CÉSAR, CNS

## 2023-09-01 ENCOUNTER — LAB VISIT (OUTPATIENT)
Dept: LAB | Facility: HOSPITAL | Age: 58
End: 2023-09-01
Payer: COMMERCIAL

## 2023-09-01 DIAGNOSIS — G35 MULTIPLE SCLEROSIS: ICD-10-CM

## 2023-09-01 LAB
BACTERIA #/AREA URNS AUTO: ABNORMAL /HPF
BILIRUB UR QL STRIP: NEGATIVE
CLARITY UR REFRACT.AUTO: ABNORMAL
COLOR UR AUTO: YELLOW
GLUCOSE UR QL STRIP: NEGATIVE
HGB UR QL STRIP: NEGATIVE
KETONES UR QL STRIP: NEGATIVE
LEUKOCYTE ESTERASE UR QL STRIP: ABNORMAL
MICROSCOPIC COMMENT: ABNORMAL
NITRITE UR QL STRIP: NEGATIVE
PH UR STRIP: 5 [PH] (ref 5–8)
PROT UR QL STRIP: NEGATIVE
RBC #/AREA URNS AUTO: 7 /HPF (ref 0–4)
SP GR UR STRIP: 1.03 (ref 1–1.03)
SQUAMOUS #/AREA URNS AUTO: 0 /HPF
URN SPEC COLLECT METH UR: ABNORMAL
WBC #/AREA URNS AUTO: >100 /HPF (ref 0–5)

## 2023-09-01 PROCEDURE — 87086 URINE CULTURE/COLONY COUNT: CPT | Performed by: CLINICAL NURSE SPECIALIST

## 2023-09-01 PROCEDURE — 87088 URINE BACTERIA CULTURE: CPT | Performed by: CLINICAL NURSE SPECIALIST

## 2023-09-01 PROCEDURE — 81001 URINALYSIS AUTO W/SCOPE: CPT | Performed by: CLINICAL NURSE SPECIALIST

## 2023-09-01 PROCEDURE — 87186 SC STD MICRODIL/AGAR DIL: CPT | Performed by: CLINICAL NURSE SPECIALIST

## 2023-09-01 PROCEDURE — 87077 CULTURE AEROBIC IDENTIFY: CPT | Performed by: CLINICAL NURSE SPECIALIST

## 2023-09-01 RX ORDER — NITROFURANTOIN 25; 75 MG/1; MG/1
100 CAPSULE ORAL 2 TIMES DAILY
Qty: 14 CAPSULE | Refills: 0 | Status: SHIPPED | OUTPATIENT
Start: 2023-09-01 | End: 2023-09-08

## 2023-09-03 LAB — BACTERIA UR CULT: ABNORMAL

## 2023-09-09 ENCOUNTER — PATIENT MESSAGE (OUTPATIENT)
Dept: NEUROLOGY | Facility: CLINIC | Age: 58
End: 2023-09-09
Payer: COMMERCIAL

## 2023-09-09 DIAGNOSIS — N39.0 URINARY TRACT INFECTION WITHOUT HEMATURIA, SITE UNSPECIFIED: Primary | ICD-10-CM

## 2023-09-13 ENCOUNTER — PATIENT MESSAGE (OUTPATIENT)
Dept: NEUROLOGY | Facility: CLINIC | Age: 58
End: 2023-09-13
Payer: COMMERCIAL

## 2023-09-14 NOTE — TELEPHONE ENCOUNTER
Sent pt revised work absence letter and a message encouraging him to apply for intermittent leave through [facility] to protect his job.

## 2023-09-15 ENCOUNTER — LAB VISIT (OUTPATIENT)
Dept: LAB | Facility: HOSPITAL | Age: 58
End: 2023-09-15
Payer: COMMERCIAL

## 2023-09-15 DIAGNOSIS — N39.0 URINARY TRACT INFECTION WITHOUT HEMATURIA, SITE UNSPECIFIED: ICD-10-CM

## 2023-09-15 LAB
BILIRUB UR QL STRIP: NEGATIVE
CLARITY UR REFRACT.AUTO: CLEAR
COLOR UR AUTO: YELLOW
GLUCOSE UR QL STRIP: NEGATIVE
HGB UR QL STRIP: NEGATIVE
KETONES UR QL STRIP: NEGATIVE
LEUKOCYTE ESTERASE UR QL STRIP: NEGATIVE
NITRITE UR QL STRIP: NEGATIVE
PH UR STRIP: 5 [PH] (ref 5–8)
PROT UR QL STRIP: NEGATIVE
SP GR UR STRIP: 1.02 (ref 1–1.03)
URN SPEC COLLECT METH UR: NORMAL

## 2023-09-15 PROCEDURE — 81003 URINALYSIS AUTO W/O SCOPE: CPT | Performed by: CLINICAL NURSE SPECIALIST

## 2023-10-27 ENCOUNTER — TELEPHONE (OUTPATIENT)
Dept: NEUROLOGY | Facility: CLINIC | Age: 58
End: 2023-10-27
Payer: COMMERCIAL

## 2023-10-27 NOTE — TELEPHONE ENCOUNTER
KAILA telling pt to call us back to r/s his 1/16 appt with Dr. Faith. I will also send a message via portal.

## 2023-12-13 ENCOUNTER — TELEPHONE (OUTPATIENT)
Dept: NEUROLOGY | Facility: CLINIC | Age: 58
End: 2023-12-13
Payer: COMMERCIAL

## 2023-12-13 NOTE — TELEPHONE ENCOUNTER
FRANK KWON Key: [id number] - PA Case ID: [id number] - Rx #: [id number]    PA submitted via [facility] on 12/13 for glatiramer

## 2023-12-15 NOTE — TELEPHONE ENCOUNTER
Request Reference Number: [id number]. GLATIRAMER INJ 40MG/ML is approved through 12/13/2024. Your patient may now fill this prescription and it will be covered.

## 2024-01-11 ENCOUNTER — PATIENT MESSAGE (OUTPATIENT)
Dept: NEUROLOGY | Facility: CLINIC | Age: 59
End: 2024-01-11
Payer: COMMERCIAL

## 2024-01-12 ENCOUNTER — TELEPHONE (OUTPATIENT)
Dept: NEUROLOGY | Facility: CLINIC | Age: 59
End: 2024-01-12
Payer: COMMERCIAL

## 2024-01-12 NOTE — TELEPHONE ENCOUNTER
KAILA telling pt to call us back to discuss changing his appt with Dr. Faith on 1/16 to a virtual due to weather conditions. I will also send a message via portal.

## 2024-01-12 NOTE — TELEPHONE ENCOUNTER
Spoke to pt and asked him how he wants to proceed with his appt on 1/16. Pt stated he has insurance, but does not have the physical insurance card. He stated he also does not have his insurance on file so he does not want to have to pay out of pocket for the appt. Pt decided to cancel the appt. I attempted to r/s him, but he said he wanted to hold off on r/s right now until he gets his insurance figured out. I informed pt of his r/s options (in person wth BB in May, in person with AP in March, or virtual with AP sometime sooner than March). I also explained the process of a VV to pt. He stated he will call us back when he is ready to schedule.

## 2024-03-01 ENCOUNTER — LAB VISIT (OUTPATIENT)
Dept: LAB | Facility: HOSPITAL | Age: 59
End: 2024-03-01
Payer: MEDICAID

## 2024-03-01 ENCOUNTER — OFFICE VISIT (OUTPATIENT)
Dept: NEUROLOGY | Facility: CLINIC | Age: 59
End: 2024-03-01
Payer: MEDICAID

## 2024-03-01 VITALS
HEART RATE: 69 BPM | DIASTOLIC BLOOD PRESSURE: 80 MMHG | WEIGHT: 174.69 LBS | SYSTOLIC BLOOD PRESSURE: 125 MMHG | HEIGHT: 69 IN | BODY MASS INDEX: 25.87 KG/M2

## 2024-03-01 DIAGNOSIS — Z71.89 COUNSELING REGARDING GOALS OF CARE: ICD-10-CM

## 2024-03-01 DIAGNOSIS — G35 MULTIPLE SCLEROSIS: ICD-10-CM

## 2024-03-01 DIAGNOSIS — G35 MULTIPLE SCLEROSIS: Primary | ICD-10-CM

## 2024-03-01 DIAGNOSIS — Z29.89 PROPHYLACTIC IMMUNOTHERAPY: ICD-10-CM

## 2024-03-01 LAB — 25(OH)D3+25(OH)D2 SERPL-MCNC: 81 NG/ML (ref 30–96)

## 2024-03-01 PROCEDURE — 3008F BODY MASS INDEX DOCD: CPT | Mod: CPTII,,, | Performed by: CLINICAL NURSE SPECIALIST

## 2024-03-01 PROCEDURE — 4010F ACE/ARB THERAPY RXD/TAKEN: CPT | Mod: CPTII,,, | Performed by: CLINICAL NURSE SPECIALIST

## 2024-03-01 PROCEDURE — 36415 COLL VENOUS BLD VENIPUNCTURE: CPT | Performed by: CLINICAL NURSE SPECIALIST

## 2024-03-01 PROCEDURE — 99999 PR PBB SHADOW E&M-EST. PATIENT-LVL III: CPT | Mod: PBBFAC,,, | Performed by: CLINICAL NURSE SPECIALIST

## 2024-03-01 PROCEDURE — G2211 COMPLEX E/M VISIT ADD ON: HCPCS | Mod: S$PBB,,, | Performed by: CLINICAL NURSE SPECIALIST

## 2024-03-01 PROCEDURE — 1159F MED LIST DOCD IN RCRD: CPT | Mod: CPTII,,, | Performed by: CLINICAL NURSE SPECIALIST

## 2024-03-01 PROCEDURE — 3074F SYST BP LT 130 MM HG: CPT | Mod: CPTII,,, | Performed by: CLINICAL NURSE SPECIALIST

## 2024-03-01 PROCEDURE — 99214 OFFICE O/P EST MOD 30 MIN: CPT | Mod: S$PBB,,, | Performed by: CLINICAL NURSE SPECIALIST

## 2024-03-01 PROCEDURE — 3079F DIAST BP 80-89 MM HG: CPT | Mod: CPTII,,, | Performed by: CLINICAL NURSE SPECIALIST

## 2024-03-01 PROCEDURE — 82306 VITAMIN D 25 HYDROXY: CPT | Performed by: CLINICAL NURSE SPECIALIST

## 2024-03-01 PROCEDURE — 99213 OFFICE O/P EST LOW 20 MIN: CPT | Mod: PBBFAC | Performed by: CLINICAL NURSE SPECIALIST

## 2024-03-01 NOTE — LETTER
March 1, 2024      [facility] [address]  [address]  [facility] [address]  Phone: [phone]       Patient: Albert Savage   YOB: 1965  Date of Visit: 03/01/2024    To Whom It May Concern:    Albert Savage is a patient under my care for the treatment of a chronic neurological condition. One of his intermittent symptoms is urinary urgency. As such, I recommend that he be allowed to use the restroom nearest to him when needed. If you require any additional information, please contact me at [phone].     Sincerely,        JULIO CÉSAR Albarado, CNS

## 2024-03-01 NOTE — PROGRESS NOTES
"Subjective:          Patient ID: Albert Savage Jr. is a 58 y.o. male who presents today for a routine clinic visit for MS.  He was last seen in July. The history has been provided by the patient. He is accompanied by his wife.     MS HPI:  DMT: Copaxone; he does not like doing the injections, but he does not miss doses   Side effects from DMT? No, just pain from the needle   Taking vitamin D3 as recommended? Yes--Vit D 6000 units and multivitamin   He has been drinking mushroom matcha, and his wife reports he has more energy with this.   He has an active job--generally close to 10,000 steps per day.   He had a UTI recently and took antibiotics for this.   He denies any significant new symptoms, signs of relapse, or clinical worsening.     Medications:  Current Outpatient Medications   Medication Sig    glatiramer (COPAXONE, GLATOPA) 40 mg/mL injection INJECT 40MG SUBCUTANEOUSLY THREE TIMES WEEKLY AT LEAST 48 HOURS  APART     SOCIAL HISTORY  Social History     Tobacco Use    Smoking status: Never    Smokeless tobacco: Never   Substance Use Topics    Alcohol use: Never    Drug use: Never       Living arrangements - the patient lives with his wife    ROS:  Fatigue: He does not nap. His energy level is good.   Sleep: he sleeps well  Bladder: he has urgency at times; denies accidents; had a UTI in September  Bowels: regular  Spasms: no issues  Vision: just got a new pair of glasses with new Rx  Memory: his wife reports that his short-term memory is off on occasion; may repeat questions; long-term is ok  Mood: no depression  Walking/balance: no changes; certain times of day/fatigue might impact his walking   Hand function: no issues   Pain: denies pain  Speech/Swallowing: speech is "not the same"--but sounds like more of a word finding issue; stumbles over words at times   He denies any hospitalizations or ER visits.   He has a good support system.   He is employed. He has health insurance.              Objective:    "     1. 25 foot timed walk:      7/18/2023    12:01 AM 3/1/2024    12:01 AM   Timed 25 Foot Walk:   Did patient wear an AFO? No No   Was assistive device used? No No   Time for 25 Foot Walk (seconds) 5.1 4.6   Time for 25 Foot Walk (seconds) 4.6        Neurologic Exam    MENTAL STATUS: grossly intact  CRANIAL NERVE EXAM: There is no internuclear ophthalmoplegia. Extraocular muscles are intact.  No facial asymmetry.There is no dysarthria.   MOTOR EXAM: Normal bulk and tone throughout UE and LE bilaterally. Rapid sequential movements are normal in UE and LE. Strength is 5/5 in all groups in the lower extremities and upper extremities.   REFLEXES: Symmetric and 2+ throughout in all 4 extremities.   SENSORY EXAM: Normal to vibration t/o  COORDINATION: Normal finger-to-nose exam and heel to shin.   GAIT: Narrow based and stable. He can walk on toes and heels. He is unable to hop on each foot. Tandem walking is relatively steady.   Imaging:       Results for orders placed during the hospital encounter of 07/17/23    MRI Brain Demyelinating W W/O Contrast    Impression  Brain appears stable from prior exam, again demonstrating findings compatible with the reported history of multiple sclerosis.  No new or enhancing lesions to indicate ongoing or active demyelination.    Electronically signed by resident: Hyun Tapia  Date:    07/17/2023  Time:    11:00    Electronically signed by: Baltazar Reeder MD  Date:    07/17/2023  Time:    12:00        Labs:     Lab Results   Component Value Date    AO6XVUTO 67.4 03/18/2022    ABSOLUTECD3 1388 03/18/2022    TD7SIFDL 15.7 03/18/2022    ABSOLUTECD8 324 03/18/2022    TP9DGYXR 50.1 03/18/2022    ABSOLUTECD4 1033 03/18/2022    LABCD48 3.19 03/18/2022     Lab Results   Component Value Date    WBC 7.95 09/01/2023    RBC 5.11 09/01/2023    HGB 14.9 09/01/2023    HCT 44.8 09/01/2023    MCV 88 09/01/2023    MCH 29.2 09/01/2023    MCHC 33.3 09/01/2023    RDW 12.0 09/01/2023      09/01/2023    MPV 12.9 09/01/2023    GRAN 4.7 09/01/2023    GRAN 59.4 09/01/2023    LYMPH 2.1 09/01/2023    LYMPH 26.9 09/01/2023    MONO 0.6 09/01/2023    MONO 7.3 09/01/2023    EOS 0.4 09/01/2023    BASO 0.08 09/01/2023    EOSINOPHIL 5.0 09/01/2023    BASOPHIL 1.0 09/01/2023     Sodium   Date Value Ref Range Status   09/01/2023 141 136 - 145 mmol/L Final     Potassium   Date Value Ref Range Status   09/01/2023 4.1 3.5 - 5.1 mmol/L Final     Chloride   Date Value Ref Range Status   09/01/2023 106 95 - 110 mmol/L Final     CO2   Date Value Ref Range Status   09/01/2023 28 23 - 29 mmol/L Final     Glucose   Date Value Ref Range Status   09/01/2023 98 70 - 110 mg/dL Final     BUN   Date Value Ref Range Status   09/01/2023 16 6 - 20 mg/dL Final     Creatinine   Date Value Ref Range Status   09/01/2023 1.4 0.5 - 1.4 mg/dL Final     Calcium   Date Value Ref Range Status   09/01/2023 9.3 8.7 - 10.5 mg/dL Final     Total Protein   Date Value Ref Range Status   09/01/2023 7.6 6.0 - 8.4 g/dL Final     Albumin   Date Value Ref Range Status   09/01/2023 4.0 3.5 - 5.2 g/dL Final     Total Bilirubin   Date Value Ref Range Status   09/01/2023 0.4 0.1 - 1.0 mg/dL Final     Comment:     For infants and newborns, interpretation of results should be based  on gestational age, weight and in agreement with clinical  observations.    Premature Infant recommended reference ranges:  Up to 24 hours.............<8.0 mg/dL  Up to 48 hours............<12.0 mg/dL  3-5 days..................<15.0 mg/dL  6-29 days.................<15.0 mg/dL       Alkaline Phosphatase   Date Value Ref Range Status   09/01/2023 60 55 - 135 U/L Final     AST   Date Value Ref Range Status   09/01/2023 17 10 - 40 U/L Final     ALT   Date Value Ref Range Status   09/01/2023 27 10 - 44 U/L Final     Anion Gap   Date Value Ref Range Status   09/01/2023 7 (L) 8 - 16 mmol/L Final     eGFR if    Date Value Ref Range Status   03/18/2022 >60.0 >60  mL/min/1.73 m^2 Final     eGFR if non    Date Value Ref Range Status   03/18/2022 >60.0 >60 mL/min/1.73 m^2 Final     Comment:     Calculation used to obtain the estimated glomerular filtration  rate (eGFR) is the CKD-EPI equation.            MS Impression and Plan:     NEURO MULTIPLE SCLEROSIS IMPRESSION:   Number of relapses in the past year?:  0  Clinical Progression:  Clinically Stable  MS Classification:  Relapsing-Remitting MS  DMT:  No change in management  DMT comment:  Continue glatiramer and Vitamin D.     MRIs are planned for July 2024--brain and spine without contrast   Note provided to patient for employer to allow him to use the nearest restroom while at work.   He will follow up with Dr. Faith in 6 months. The visit today is associated with current or anticipated ongoing medical care related to this patient's single serious condition/complex condition of multiple sclerosis.    Total time spent with patient: 29 minutes   Total time spent on encounter: 36 minutes         JULIO CÉSAR Med[place], CNS    Problem List Items Addressed This Visit    None  Visit Diagnoses       Multiple sclerosis    -  Primary    Relevant Orders    MRI Cervical Spine Demyelinating Without Contrast    Vitamin D    MRI Brain Demyelinating Without Contrast    MRI Thoracic Spine Demyelinating Without Contrast

## 2024-03-05 ENCOUNTER — PATIENT MESSAGE (OUTPATIENT)
Dept: NEUROLOGY | Facility: CLINIC | Age: 59
End: 2024-03-05
Payer: MEDICAID

## 2024-04-16 ENCOUNTER — TELEPHONE (OUTPATIENT)
Dept: NEUROLOGY | Facility: CLINIC | Age: 59
End: 2024-04-16
Payer: MEDICAID

## 2024-04-16 DIAGNOSIS — G35 MULTIPLE SCLEROSIS: ICD-10-CM

## 2024-04-16 RX ORDER — GLATIRAMER 40 MG/ML
40 INJECTION, SOLUTION SUBCUTANEOUS
Qty: 36 ML | Refills: 1 | Status: ACTIVE | OUTPATIENT
Start: 2024-04-17

## 2024-04-16 NOTE — TELEPHONE ENCOUNTER
----- Message from Kourtney Salter RN sent at 4/16/2024 12:12 PM CDT -----  Regarding: FW: Refill  Contact: [phone]    ----- Message -----  From: Karen Cordon  Sent: 4/16/2024  10:35 AM CDT  To: Vianney WEAVER Staff  Subject: Refill                                           Rx Refill/Request    Is this a Refill or New Rx:  refill    Rx Name and Strength:  glatiramer (COPAXONE, GLATOPA) 40 mg/mL injection 12 mL    Preferred Pharmacy with phone number:    [facility] [address] [place] [address]  Phone: [phone] Fax: [phone]      Additional Information:Raina[facility] calling in request. Please call [phone]

## 2024-04-17 ENCOUNTER — PATIENT MESSAGE (OUTPATIENT)
Dept: NEUROLOGY | Facility: CLINIC | Age: 59
End: 2024-04-17
Payer: MEDICAID

## 2024-07-08 ENCOUNTER — HOSPITAL ENCOUNTER (OUTPATIENT)
Dept: RADIOLOGY | Facility: HOSPITAL | Age: 59
Discharge: HOME | End: 2024-07-08
Attending: CLINICAL NURSE SPECIALIST
Payer: MEDICAID

## 2024-07-08 DIAGNOSIS — G35 MULTIPLE SCLEROSIS: ICD-10-CM

## 2024-07-08 PROCEDURE — 72141 MRI NECK SPINE W/O DYE: CPT | Mod: 26,,, | Performed by: RADIOLOGY

## 2024-07-08 PROCEDURE — 72146 MRI CHEST SPINE W/O DYE: CPT | Mod: 26,,, | Performed by: RADIOLOGY

## 2024-07-08 PROCEDURE — 70551 MRI BRAIN STEM W/O DYE: CPT | Mod: 26,,, | Performed by: RADIOLOGY

## 2024-07-08 PROCEDURE — 72146 MRI CHEST SPINE W/O DYE: CPT | Mod: TC

## 2024-07-08 PROCEDURE — 70551 MRI BRAIN STEM W/O DYE: CPT | Mod: TC

## 2024-07-08 PROCEDURE — 72141 MRI NECK SPINE W/O DYE: CPT | Mod: TC

## 2024-09-09 DIAGNOSIS — G35 MULTIPLE SCLEROSIS: ICD-10-CM

## 2024-09-09 RX ORDER — GLATIRAMER 40 MG/ML
INJECTION, SOLUTION SUBCUTANEOUS
Qty: 36 ML | Refills: 1 | Status: SHIPPED | OUTPATIENT
Start: 2024-09-09

## 2024-10-15 NOTE — TELEPHONE ENCOUNTER
Albert Savage (Key: [id number])  PA Case ID #: [id number]    PA for glatiramer submitted via [facility]

## 2025-02-11 DIAGNOSIS — G35 MULTIPLE SCLEROSIS: ICD-10-CM

## 2025-02-11 RX ORDER — GLATIRAMER 40 MG/ML
40 INJECTION, SOLUTION SUBCUTANEOUS
Qty: 36 ML | Refills: 1 | Status: SHIPPED | OUTPATIENT
Start: 2025-02-12

## 2025-02-12 ENCOUNTER — TELEPHONE (OUTPATIENT)
Dept: NEUROLOGY | Facility: CLINIC | Age: 60
End: 2025-02-12
Payer: MEDICAID

## 2025-02-12 NOTE — TELEPHONE ENCOUNTER
Spoke with pt in regards to getting pt scheduled for a f/u with BB, pt states he will have to call the office back to schedule do to job schedule.

## 2025-04-25 NOTE — PROGRESS NOTES
"Subjective:       Patient ID: Albert Savage Jr. is a 59 y.o. male who presents today for a {Blank single:10304::"routine","fit-in"} clinic visit for MS.      MS HPI:  History of Present Illness              DMT: ***  Side effects from DMT? {YES **/NO:26012}  Taking vitamin D3 as recommended? {YES **/NO:98459} Dose:   ***    SOCIAL HISTORY  Social History[1]  Living arrangements - the patient {lives with:798590}.  Employment ***    MS ROS:  Fatigue: {YES **/NO:24385}  Sleep Disturbance: {YES **/NO:75998}  Bladder Dysfunction: {YES **/NO:27270}  Bowel Dysfunction: {YES **/NO:39168}  Spasticity: {YES **/NO:83755}  Visual Symptoms: {YES **/NO:43747}  Cognitive: {YES **/NO:82581}  Mood Disorder: {YES **/NO:75142}  Gait Disturbance: {YES **/NO:05443}  Falls: {YES **/NO:08268}  Hand Dysfunction: {YES **/NO:75902}  Pain: {YES **/NO:84156}  Sexual Dysfunction: Not Assessed  Skin Breakdown: {YES **/NO:58169}  Tremors: {YES **/NO:91801}  Dysphagia:  {YES **/NO:49913}  Dysarthria:  {YES **/NO:18349}  Heat sensitivity:  {YES **/NO:81251}  Any un-met adaptive needs? {YES **/NO:24047}  Copay Assist?  {YES **/NO:28934}  Clinical Trial candidate? {YES **/NO:75355}            Objective:        1. 25 foot timed walk:***      7/18/2023    10:00 AM 3/1/2024     9:30 AM   Timed 25 Foot Walk:   Did patient wear an AFO? No No   Was assistive device used? No No   Time for 25 Foot Walk (seconds) 5.1 4.6   Time for 25 Foot Walk (seconds) 4.6      2. SDMT:***  3. 9 Hole Peg Test:***       No data to display                Neurological Exam          Imaging:     Results for orders placed during the hospital encounter of 07/08/24    MRI Brain Demyelinating Without Contrast    Impression  Brain and spinal cord appears stable from prior exam, again demonstrating findings compatible with the reported history of multiple sclerosis. No new discrete lesions to indicate ongoing demyelination.    Mild cervical and thoracic spondylosis without new " large focal disc herniation or cord compression.    Electronically signed by resident: Wilman Pittman  Date:    07/08/2024  Time:    09:58    Electronically signed by: Baltazar Reeder MD  Date:    07/08/2024  Time:    11:39    Results for orders placed during the hospital encounter of 07/08/24    MRI Cervical Spine Demyelinating Without Contrast    Impression  Brain and spinal cord appears stable from prior exam, again demonstrating findings compatible with the reported history of multiple sclerosis. No new discrete lesions to indicate ongoing demyelination.    Mild cervical and thoracic spondylosis without new large focal disc herniation or cord compression.    Electronically signed by resident: Wilman Pittman  Date:    07/08/2024  Time:    09:58    Electronically signed by: Baltazar Reeder MD  Date:    07/08/2024  Time:    11:39    Results for orders placed during the hospital encounter of 07/08/24    MRI Thoracic Spine Demyelinating Without Contrast    Impression  Brain and spinal cord appears stable from prior exam, again demonstrating findings compatible with the reported history of multiple sclerosis. No new discrete lesions to indicate ongoing demyelination.    Mild cervical and thoracic spondylosis without new large focal disc herniation or cord compression.    Electronically signed by resident: Wilman Pittman  Date:    07/08/2024  Time:    09:58    Electronically signed by: Baltazar Reeder MD  Date:    07/08/2024  Time:    11:39    Results for orders placed during the hospital encounter of 07/17/23    MRI Brain Demyelinating W W/O Contrast    Impression  Brain appears stable from prior exam, again demonstrating findings compatible with the reported history of multiple sclerosis.  No new or enhancing lesions to indicate ongoing or active demyelination.    Electronically signed by resident: Hyun Tapia  Date:    07/17/2023  Time:    11:00    Electronically signed by: Baltazar Reeder  "MD  Date:    07/17/2023  Time:    12:00    Results for orders placed during the hospital encounter of 03/18/22    MRI Cervical Spine Demyelinating W W/O Contrast    Impression  Focal lesions within the cord at C3-4, C5-6, and T1 levels consistent with demyelinating disease.  No cord expansion or enhancement.  Relatively mild degenerative changes cervical spine.      Electronically signed by: Denver Arnold  Date:    03/20/2022  Time:    10:37    Results for orders placed during the hospital encounter of 03/18/22    MRI Thoracic Spine Demyelinating W W/O Contrast    Impression  Focal lesions within the cord at C3-4, C5-6, and T1 levels consistent with demyelinating disease.  No cord expansion or enhancement.  Relatively mild degenerative changes cervical spine.      Electronically signed by: Denver Arnold  Date:    03/20/2022  Time:    10:37      Labs:     Lab Results   Component Value Date    [id number] 81 03/01/2024     No results found for: "JCVINDEX", "JCVANTIBODY"  Lab Results   Component Value Date    PB6GQWMR 67.4 03/18/2022    ABSOLUTECD3 1388 03/18/2022    LT8WKRLG 15.7 03/18/2022    ABSOLUTECD8 324 03/18/2022    IF9TLSJZ 50.1 03/18/2022    ABSOLUTECD4 1033 03/18/2022    LABCD48 3.19 03/18/2022     Lab Results   Component Value Date    WBC 7.95 09/01/2023    RBC 5.11 09/01/2023    HGB 14.9 09/01/2023    HCT 44.8 09/01/2023    MCV 88 09/01/2023    MCH 29.2 09/01/2023    MCHC 33.3 09/01/2023    RDW 12.0 09/01/2023     09/01/2023    MPV 12.9 09/01/2023    GRAN 4.7 09/01/2023    GRAN 59.4 09/01/2023    LYMPH 2.1 09/01/2023    LYMPH 26.9 09/01/2023    MONO 0.6 09/01/2023    MONO 7.3 09/01/2023    EOS 0.4 09/01/2023    BASO 0.08 09/01/2023    EOSINOPHIL 5.0 09/01/2023    BASOPHIL 1.0 09/01/2023     Sodium   Date Value Ref Range Status   09/01/2023 141 136 - 145 mmol/L Final     Potassium   Date Value Ref Range Status   09/01/2023 4.1 3.5 - 5.1 mmol/L Final     Chloride   Date Value Ref Range Status "   09/01/2023 106 95 - 110 mmol/L Final     CO2   Date Value Ref Range Status   09/01/2023 28 23 - 29 mmol/L Final     Glucose   Date Value Ref Range Status   09/01/2023 98 70 - 110 mg/dL Final     BUN   Date Value Ref Range Status   09/01/2023 16 6 - 20 mg/dL Final     Creatinine   Date Value Ref Range Status   09/01/2023 1.4 0.5 - 1.4 mg/dL Final     Calcium   Date Value Ref Range Status   09/01/2023 9.3 8.7 - 10.5 mg/dL Final     Total Protein   Date Value Ref Range Status   09/01/2023 7.6 6.0 - 8.4 g/dL Final     Albumin   Date Value Ref Range Status   09/01/2023 4.0 3.5 - 5.2 g/dL Final     Total Bilirubin   Date Value Ref Range Status   09/01/2023 0.4 0.1 - 1.0 mg/dL Final     Comment:     For infants and newborns, interpretation of results should be based  on gestational age, weight and in agreement with clinical  observations.    Premature Infant recommended reference ranges:  Up to 24 hours.............<8.0 mg/dL  Up to 48 hours............<12.0 mg/dL  3-5 days..................<15.0 mg/dL  6-29 days.................<15.0 mg/dL       Alkaline Phosphatase   Date Value Ref Range Status   09/01/2023 60 55 - 135 U/L Final     AST   Date Value Ref Range Status   09/01/2023 17 10 - 40 U/L Final     ALT   Date Value Ref Range Status   09/01/2023 27 10 - 44 U/L Final     Anion Gap   Date Value Ref Range Status   09/01/2023 7 (L) 8 - 16 mmol/L Final     eGFR if    Date Value Ref Range Status   03/18/2022 >60.0 >60 mL/min/1.73 m^2 Final     eGFR if non    Date Value Ref Range Status   03/18/2022 >60.0 >60 mL/min/1.73 m^2 Final     Comment:     Calculation used to obtain the estimated glomerular filtration  rate (eGFR) is the CKD-EPI equation.                    Diagnosis/Assessment/Plan:    1. Multiple Sclerosis  Assessment:***  Imaging:***  Disease Modifying Therapies:***    2. MS Symptom Assessment / Management  Fatigue: ***  Sleep Disturbance: ***  Bladder Dysfunction: ***  Bowel  Dysfunction: ***  Spasticity: ***  Visual Symptoms: ***  Cognitive: ***  Mood Disorder: ***  Gait Disturbance: ***  Falls: ***  Hand Dysfunction: ***   Pain: ***  Sexual Dysfunction: ***   Skin Breakdown: ***  Tremors: ***  Dysphagia:  ***  Dysarthria:  ***  Heat sensitivity: ***    Adaptive needs: ***   Copay Assist: ***          Assessment & Plan                Our visit today lasted 40 minutes, and 100% of this time was spent face to face with the patient. Over 50% of this visit included discussion of the treatment plan/medication changes/symptom management/exam findings/imaging results/coordination of care. The patient agrees with the plan of care.         Problem List Items Addressed This Visit    None        This note was generated with the assistance of ambient listening technology. Verbal consent was obtained by the patient and accompanying visitor(s) for the recording of patient appointment to facilitate this note. I attest to having reviewed and edited the generated note for accuracy, though some syntax or spelling errors may persist. Please contact the author of this note for any clarification.           [1]   Social History  Tobacco Use    Smoking status: Never    Smokeless tobacco: Never   Substance Use Topics    Alcohol use: Never    Drug use: Never

## 2025-05-02 ENCOUNTER — OFFICE VISIT (OUTPATIENT)
Dept: NEUROLOGY | Facility: CLINIC | Age: 60
End: 2025-05-02
Payer: MEDICAID

## 2025-05-02 ENCOUNTER — PATIENT MESSAGE (OUTPATIENT)
Dept: NEUROLOGY | Facility: CLINIC | Age: 60
End: 2025-05-02

## 2025-05-02 VITALS
WEIGHT: 180.25 LBS | DIASTOLIC BLOOD PRESSURE: 77 MMHG | SYSTOLIC BLOOD PRESSURE: 119 MMHG | BODY MASS INDEX: 26.7 KG/M2 | HEIGHT: 69 IN

## 2025-05-02 DIAGNOSIS — G35 MULTIPLE SCLEROSIS: Primary | ICD-10-CM

## 2025-05-02 DIAGNOSIS — Z29.89 PROPHYLACTIC IMMUNOTHERAPY: ICD-10-CM

## 2025-05-02 DIAGNOSIS — Z71.89 COUNSELING REGARDING GOALS OF CARE: ICD-10-CM

## 2025-05-02 PROCEDURE — 99999 PR PBB SHADOW E&M-EST. PATIENT-LVL III: CPT | Mod: PBBFAC,,, | Performed by: CLINICAL NURSE SPECIALIST

## 2025-05-02 PROCEDURE — 99213 OFFICE O/P EST LOW 20 MIN: CPT | Mod: PBBFAC | Performed by: CLINICAL NURSE SPECIALIST

## 2025-05-02 NOTE — PROGRESS NOTES
Subjective:          Patient ID: Albetr Savage Jr. is a 59 y.o. male who presents today for a routine clinic visit for MS.  He was last seen in March 2024. The history has been provided by the patient. He is accompanied by his wife.   NEURO MULTIPLE SCLEROISIS SUMMARY:   Principle neurological diagnosis:  MS  Date of Symptom Onset:  Prior to 2015  Date of Diagnosis:  2015  Disease type at diagnosis:  Relapsing-Remitting MS  Disease type currently:  Relapsing-Remitting MS  Previous Therapy:  First DMT:  Glatiramer acetate - [id number]; 2022 to present  Current Therapy:  Glatiramer acetate  Last MRI Brain:  7/8/2024  Last MRI C-Spine:  7/8/2024  Last MRI T-Spine:  7/8/2024          MS HPI:  DMT: Glatiramer 40mg three times a week; no missed doses   Side effects from DMT? No  Taking vitamin D3 as recommended?   His wife reports that he has not been moving as well--more slowly and more off balance. His wife notices that he holds on to things more when he is walking.  He has to take his time. His wife feels that he did better on brand name Copaxone.   He has had 2 falls since the last visit when moving quickly.   He had an upper respiratory infection earlier this year.   He does not exercise, but stays active at work--got over 13,000 steps yesterday at work.   He inquires about an oral medication for MS today.     Medications:  Current Outpatient Medications   Medication Sig    glatiramer (COPAXONE, GLATOPA) 40 mg/mL injection Inject 40 mg into the skin 3 (three) times a week.    multivitamin with minerals tablet Take 1 tablet by mouth once daily.    mv-mn/iron/folic acid/herb 190 (VITAMIN D3 COMPLETE ORAL) Take 5,000 Units by mouth.     No current facility-administered medications for this visit.       SOCIAL HISTORY  Social History[1]    Living arrangements - the patient lives with his wife     ROS:  Fatigue:  His energy level is good; he can get through the workday. His wife states that he seems more tired at the  "end of the day.   Sleep: he sleeps well; no issues  Bladder: he is closer to the bathroom at work now, so he has not had any accidents. His wife reports that he seems like he urinates more often, but he does drink lots of water. He denies any UTIs.   Bowels: regular  Spasms: no issues  Vision: wears glasses all day, every day.   Memory: his wife reports that his short-term memory is off on occasion; may repeat questions; not worse compared to last year   Mood: no depression or anxiety; aggravated every once in a while when he can't do something physically   Walking/balance: as above  Hand function: no issues   Pain: denies pain  Speech/Swallowing: He states "sometimes people don't understand what I'm saying."  He acknowledges that he speaks quickly. He can't get his words out at times, or it comes out differently than it sounds in his head.   He denies any hospitalizations or ER visits.   He has a good support system.   He is employed full-time. He has health insurance.            Objective:        1. 25 foot timed walk:      3/1/2024     9:30 AM 5/2/2025     8:30 AM   Timed 25 Foot Walk:   Did patient wear an AFO? No No   Was assistive device used? No No   Time for 25 Foot Walk (seconds) 4.6 5.6   Time for 25 Foot Walk (seconds)  5.6       Neurological Exam    MENTAL STATUS: grossly intact  CRANIAL NERVE EXAM: There is no internuclear ophthalmoplegia. Extraocular muscles are intact.  No facial asymmetry.There is no dysarthria. Visual acuity 20/20 OD and OS with glasses.   MOTOR EXAM: Normal bulk and tone throughout UE and LE bilaterally. Rapid sequential movements are normal in bilateral UE and RLE, slightly less coordinated in LLE.  Strength is 5/5 in all groups in RUE and RLE, 5-/5 in LUE and LLE in flexors   REFLEXES: Symmetric and 2+ throughout in all 4 extremities.   SENSORY EXAM: Normal to vibration t/o in UE and LE   COORDINATION: Normal finger-to-nose exam and heel to shin   GAIT: Slightly wide based and " slower, somewhat off balance with turns. He can walk on toes and heels.  Tandem walking is relatively steady. Romberg with mild sway.   Imaging:     Results for orders placed during the hospital encounter of 07/08/24    MRI Brain Demyelinating Without Contrast    Impression  Brain and spinal cord appears stable from prior exam, again demonstrating findings compatible with the reported history of multiple sclerosis. No new discrete lesions to indicate ongoing demyelination.    Mild cervical and thoracic spondylosis without new large focal disc herniation or cord compression.    Electronically signed by resident: Wilman Pittman  Date:    07/08/2024  Time:    09:58    Electronically signed by: Baltazar Reeder MD  Date:    07/08/2024  Time:    11:39    Results for orders placed during the hospital encounter of 07/08/24    MRI Cervical Spine Demyelinating Without Contrast    Impression  Brain and spinal cord appears stable from prior exam, again demonstrating findings compatible with the reported history of multiple sclerosis. No new discrete lesions to indicate ongoing demyelination.    Mild cervical and thoracic spondylosis without new large focal disc herniation or cord compression.    Electronically signed by resident: Wilman Pittman  Date:    07/08/2024  Time:    09:58    Electronically signed by: Baltazar Reeder MD  Date:    07/08/2024  Time:    11:39    Results for orders placed during the hospital encounter of 07/08/24    MRI Thoracic Spine Demyelinating Without Contrast    Impression  Brain and spinal cord appears stable from prior exam, again demonstrating findings compatible with the reported history of multiple sclerosis. No new discrete lesions to indicate ongoing demyelination.    Mild cervical and thoracic spondylosis without new large focal disc herniation or cord compression.    Electronically signed by resident: Wilamn Pittman  Date:    07/08/2024  Time:    09:58    Electronically signed by: Baltazar Reeder  MD  Date:    07/08/2024  Time:    11:39    Labs:     Lab Results   Component Value Date    [id number] 81 03/01/2024     Lab Results   Component Value Date    WBC 7.95 09/01/2023    RBC 5.11 09/01/2023    HGB 14.9 09/01/2023    HCT 44.8 09/01/2023    MCV 88 09/01/2023    MCH 29.2 09/01/2023    MCHC 33.3 09/01/2023    RDW 12.0 09/01/2023     09/01/2023    MPV 12.9 09/01/2023    GRAN 4.7 09/01/2023    GRAN 59.4 09/01/2023    LYMPH 2.1 09/01/2023    LYMPH 26.9 09/01/2023    MONO 0.6 09/01/2023    MONO 7.3 09/01/2023    EOS 0.4 09/01/2023    BASO 0.08 09/01/2023    EOSINOPHIL 5.0 09/01/2023    BASOPHIL 1.0 09/01/2023     Sodium   Date Value Ref Range Status   09/01/2023 141 136 - 145 mmol/L Final     Potassium   Date Value Ref Range Status   09/01/2023 4.1 3.5 - 5.1 mmol/L Final     Chloride   Date Value Ref Range Status   09/01/2023 106 95 - 110 mmol/L Final     CO2   Date Value Ref Range Status   09/01/2023 28 23 - 29 mmol/L Final     Glucose   Date Value Ref Range Status   09/01/2023 98 70 - 110 mg/dL Final     BUN   Date Value Ref Range Status   09/01/2023 16 6 - 20 mg/dL Final     Creatinine   Date Value Ref Range Status   09/01/2023 1.4 0.5 - 1.4 mg/dL Final     Calcium   Date Value Ref Range Status   09/01/2023 9.3 8.7 - 10.5 mg/dL Final     Total Protein   Date Value Ref Range Status   09/01/2023 7.6 6.0 - 8.4 g/dL Final     Albumin   Date Value Ref Range Status   09/01/2023 4.0 3.5 - 5.2 g/dL Final     Total Bilirubin   Date Value Ref Range Status   09/01/2023 0.4 0.1 - 1.0 mg/dL Final     Comment:     For infants and newborns, interpretation of results should be based  on gestational age, weight and in agreement with clinical  observations.    Premature Infant recommended reference ranges:  Up to 24 hours.............<8.0 mg/dL  Up to 48 hours............<12.0 mg/dL  3-5 days..................<15.0 mg/dL  6-29 days.................<15.0 mg/dL       Alkaline Phosphatase   Date Value Ref Range Status    09/01/2023 60 55 - 135 U/L Final     AST   Date Value Ref Range Status   09/01/2023 17 10 - 40 U/L Final     ALT   Date Value Ref Range Status   09/01/2023 27 10 - 44 U/L Final     Anion Gap   Date Value Ref Range Status   09/01/2023 7 (L) 8 - 16 mmol/L Final     eGFR if    Date Value Ref Range Status   03/18/2022 >60.0 >60 mL/min/1.73 m^2 Final     eGFR if non    Date Value Ref Range Status   03/18/2022 >60.0 >60 mL/min/1.73 m^2 Final     Comment:     Calculation used to obtain the estimated glomerular filtration  rate (eGFR) is the CKD-EPI equation.          MS Impression and Plan:     NEURO MULTIPLE SCLEROSIS IMPRESSION:   Number of relapses in the past year?:  0  Clinical Progression comment:  His walk time is 1 second slower today, and there is a very subtle difference in strength and coordination on the left side. He is also off balance at times. His wife acknowledges that he moves a bit slower and has more challenges with balance; he also seems more fatigued. In light of these changes, we discussed advancing disease modifying therapy. He has been on glatiramer for years and is getting tired of frequent injections.   He has been diagnosed with MS for 10 years.   MRI Progression:  Stable  MS Classification:  Relapsing-Remitting MS  Current DMT: glatiramer acetate  Current DMT comment: Continue glatiramer and Vitamin D. Will check Vitamin D level today.   DMT comment:  We discussed several alternative DMTs to glatiramer today, including teriflunomide, Zeposia, Ocrevus, Briumvi, Kesimpta, and Mavenclad. Mechanisms of action, risks, side effects, etc were discussed. Literature was also provided to the patient. He will review this information and let me know how he would like to proceed. We will plan for labs once I hear back from him.   Symptom Management:  No change in symptom management  Additional Impressions:   MRI brain in July   He will follow up with Dr. Faith in 6 months.    The visit today is associated with current or anticipated ongoing medical care related to this patient's single serious condition/complex condition of multiple sclerosis.      Total time spent with patient: 51 minutes   Total time spent on encounter: 65 minutes           JULIO CÉSAR [place], CNS    Problem List Items Addressed This Visit          Neurologic Problems    Multiple sclerosis - Primary    Relevant Orders    MRI Brain Demyelinating W W/O Contrast    MRI Cervical Spine Demyelinating W W/O Contrast    MRI Thoracic Spine Demyelinating W W/O Contrast    Ambulatory referral/consult to Internal Medicine     Other Visit Diagnoses         Counseling regarding goals of care          Prophylactic immunotherapy                         [1]  Social History  Tobacco Use    Smoking status: Never    Smokeless tobacco: Never   Substance Use Topics    Alcohol use: Never    Drug use: Never

## 2025-05-13 ENCOUNTER — PATIENT MESSAGE (OUTPATIENT)
Dept: PSYCHIATRY | Facility: CLINIC | Age: 60
End: 2025-05-13
Payer: COMMERCIAL

## 2025-05-16 ENCOUNTER — PATIENT MESSAGE (OUTPATIENT)
Dept: NEUROLOGY | Facility: CLINIC | Age: 60
End: 2025-05-16
Payer: COMMERCIAL

## 2025-05-17 ENCOUNTER — HOSPITAL ENCOUNTER (OUTPATIENT)
Dept: RADIOLOGY | Facility: HOSPITAL | Age: 60
Discharge: HOME | End: 2025-05-17
Attending: CLINICAL NURSE SPECIALIST
Payer: COMMERCIAL

## 2025-05-17 DIAGNOSIS — G35 MULTIPLE SCLEROSIS: ICD-10-CM

## 2025-05-17 PROCEDURE — 72156 MRI NECK SPINE W/O & W/DYE: CPT | Mod: 26,,, | Performed by: RADIOLOGY

## 2025-05-17 PROCEDURE — 25500020 PHARM REV CODE 255: Performed by: CLINICAL NURSE SPECIALIST

## 2025-05-17 PROCEDURE — 70553 MRI BRAIN STEM W/O & W/DYE: CPT | Mod: TC

## 2025-05-17 PROCEDURE — A9585 GADOBUTROL INJECTION: HCPCS | Performed by: CLINICAL NURSE SPECIALIST

## 2025-05-17 PROCEDURE — 70553 MRI BRAIN STEM W/O & W/DYE: CPT | Mod: 26,,, | Performed by: RADIOLOGY

## 2025-05-17 PROCEDURE — 72156 MRI NECK SPINE W/O & W/DYE: CPT | Mod: TC

## 2025-05-17 PROCEDURE — 72157 MRI CHEST SPINE W/O & W/DYE: CPT | Mod: 26,,, | Performed by: RADIOLOGY

## 2025-05-17 PROCEDURE — 72157 MRI CHEST SPINE W/O & W/DYE: CPT | Mod: TC

## 2025-05-17 RX ORDER — GADOBUTROL 604.72 MG/ML
8 INJECTION INTRAVENOUS
Status: COMPLETED | OUTPATIENT
Start: 2025-05-17 | End: 2025-05-17

## 2025-05-17 RX ADMIN — GADOBUTROL 8 ML: 604.72 INJECTION INTRAVENOUS at 01:05

## 2025-05-19 ENCOUNTER — RESULTS FOLLOW-UP (OUTPATIENT)
Dept: NEUROLOGY | Facility: CLINIC | Age: 60
End: 2025-05-19

## 2025-06-13 ENCOUNTER — OFFICE VISIT (OUTPATIENT)
Dept: PRIMARY CARE CLINIC | Facility: CLINIC | Age: 60
End: 2025-06-13
Payer: COMMERCIAL

## 2025-06-13 ENCOUNTER — LAB VISIT (OUTPATIENT)
Dept: LAB | Facility: HOSPITAL | Age: 60
End: 2025-06-13
Attending: NURSE PRACTITIONER
Payer: COMMERCIAL

## 2025-06-13 VITALS
HEIGHT: 69 IN | OXYGEN SATURATION: 99 % | HEART RATE: 80 BPM | SYSTOLIC BLOOD PRESSURE: 134 MMHG | DIASTOLIC BLOOD PRESSURE: 84 MMHG | WEIGHT: 180.13 LBS | BODY MASS INDEX: 26.68 KG/M2

## 2025-06-13 DIAGNOSIS — Z00.00 ROUTINE MEDICAL EXAM: Primary | ICD-10-CM

## 2025-06-13 DIAGNOSIS — Z00.00 ROUTINE MEDICAL EXAM: ICD-10-CM

## 2025-06-13 DIAGNOSIS — Z12.5 PROSTATE CANCER SCREENING: ICD-10-CM

## 2025-06-13 DIAGNOSIS — G35 MULTIPLE SCLEROSIS: ICD-10-CM

## 2025-06-13 DIAGNOSIS — Z12.11 COLON CANCER SCREENING: ICD-10-CM

## 2025-06-13 LAB
ABSOLUTE EOSINOPHIL (OHS): 0.25 K/UL
ABSOLUTE MONOCYTE (OHS): 0.57 K/UL (ref 0.3–1)
ABSOLUTE NEUTROPHIL COUNT (OHS): 5.6 K/UL (ref 1.8–7.7)
ALBUMIN SERPL BCP-MCNC: 4.4 G/DL (ref 3.5–5.2)
ALP SERPL-CCNC: 52 UNIT/L (ref 40–150)
ALT SERPL W/O P-5'-P-CCNC: 26 UNIT/L (ref 10–44)
ANION GAP (OHS): 9 MMOL/L (ref 8–16)
AST SERPL-CCNC: 21 UNIT/L (ref 11–45)
BASOPHILS # BLD AUTO: 0.07 K/UL
BASOPHILS NFR BLD AUTO: 0.8 %
BILIRUB SERPL-MCNC: 0.3 MG/DL (ref 0.1–1)
BUN SERPL-MCNC: 15 MG/DL (ref 6–20)
CALCIUM SERPL-MCNC: 9.1 MG/DL (ref 8.7–10.5)
CHLORIDE SERPL-SCNC: 106 MMOL/L (ref 95–110)
CHOLEST SERPL-MCNC: 130 MG/DL (ref 120–199)
CHOLEST/HDLC SERPL: 3.7 {RATIO} (ref 2–5)
CO2 SERPL-SCNC: 26 MMOL/L (ref 23–29)
CREAT SERPL-MCNC: 1.2 MG/DL (ref 0.5–1.4)
EAG (OHS): 103 MG/DL (ref 68–131)
ERYTHROCYTE [DISTWIDTH] IN BLOOD BY AUTOMATED COUNT: 12.5 % (ref 11.5–14.5)
GFR SERPLBLD CREATININE-BSD FMLA CKD-EPI: >60 ML/MIN/1.73/M2
GLUCOSE SERPL-MCNC: 76 MG/DL (ref 70–110)
HBA1C MFR BLD: 5.2 % (ref 4–5.6)
HCT VFR BLD AUTO: 42.8 % (ref 40–54)
HDLC SERPL-MCNC: 35 MG/DL (ref 40–75)
HDLC SERPL: 26.9 % (ref 20–50)
HGB BLD-MCNC: 14.6 GM/DL (ref 14–18)
IMM GRANULOCYTES # BLD AUTO: 0.02 K/UL (ref 0–0.04)
IMM GRANULOCYTES NFR BLD AUTO: 0.2 % (ref 0–0.5)
LDLC SERPL CALC-MCNC: 74.8 MG/DL (ref 63–159)
LYMPHOCYTES # BLD AUTO: 1.85 K/UL (ref 1–4.8)
MCH RBC QN AUTO: 30.8 PG (ref 27–31)
MCHC RBC AUTO-ENTMCNC: 34.1 G/DL (ref 32–36)
MCV RBC AUTO: 90 FL (ref 82–98)
NONHDLC SERPL-MCNC: 95 MG/DL
NUCLEATED RBC (/100WBC) (OHS): 0 /100 WBC
PLATELET # BLD AUTO: 165 K/UL (ref 150–450)
PMV BLD AUTO: 13.5 FL (ref 9.2–12.9)
POTASSIUM SERPL-SCNC: 4.6 MMOL/L (ref 3.5–5.1)
PROT SERPL-MCNC: 7.6 GM/DL (ref 6–8.4)
PSA SERPL-MCNC: 0.25 NG/ML
RBC # BLD AUTO: 4.74 M/UL (ref 4.6–6.2)
RELATIVE EOSINOPHIL (OHS): 3 %
RELATIVE LYMPHOCYTE (OHS): 22.1 % (ref 18–48)
RELATIVE MONOCYTE (OHS): 6.8 % (ref 4–15)
RELATIVE NEUTROPHIL (OHS): 67.1 % (ref 38–73)
SODIUM SERPL-SCNC: 141 MMOL/L (ref 136–145)
T4 FREE SERPL-MCNC: 1.1 NG/DL (ref 0.71–1.51)
TRIGL SERPL-MCNC: 101 MG/DL (ref 30–150)
TSH SERPL-ACNC: 0.73 UIU/ML (ref 0.4–4)
WBC # BLD AUTO: 8.36 K/UL (ref 3.9–12.7)

## 2025-06-13 PROCEDURE — 80053 COMPREHEN METABOLIC PANEL: CPT

## 2025-06-13 PROCEDURE — 84153 ASSAY OF PSA TOTAL: CPT

## 2025-06-13 PROCEDURE — 84443 ASSAY THYROID STIM HORMONE: CPT

## 2025-06-13 PROCEDURE — 36415 COLL VENOUS BLD VENIPUNCTURE: CPT | Mod: PN

## 2025-06-13 PROCEDURE — 83036 HEMOGLOBIN GLYCOSYLATED A1C: CPT

## 2025-06-13 PROCEDURE — 99999 PR PBB SHADOW E&M-EST. PATIENT-LVL IV: CPT | Mod: PBBFAC,,, | Performed by: NURSE PRACTITIONER

## 2025-06-13 PROCEDURE — 80061 LIPID PANEL: CPT

## 2025-06-13 PROCEDURE — 85025 COMPLETE CBC W/AUTO DIFF WBC: CPT

## 2025-06-13 PROCEDURE — 84439 ASSAY OF FREE THYROXINE: CPT

## 2025-06-13 NOTE — PROGRESS NOTES
[facility] Note    Chief Complaint      Chief Complaint   Patient presents with    Annual Exam    Establish Care       History of Present Illness      Albert Savage Jr. is a 59 y.o. male who presents today for   Chief Complaint   Patient presents with    Annual Exam    Establish Care         CHIEF COMPLAINT:  Patient presents for follow-up including lab work and discussion of recent MRI results.    HPI:  Patient has multiple sclerosis (MS) and recently had an annual MRI. The MRI revealed a small, typically benign lesion on the spine. The MS doctor recommends continued monitoring with spine imaging and suggests an ultrasound if the lesion appears to be getting larger. Patient had a previous urinary tract infection, for which a panel was done to check for the infection. Patient is evaluated by an MS doctor regularly for his condition.    Patient denies any known health issues besides MS and any past surgeries.    MEDICAL HISTORY:  Patient has a history of multiple sclerosis (MS) and a past occurrence of urinary tract infection.      ROS:  General: -fever, -chills, -fatigue, -weight gain, -weight loss  Eyes: -vision changes, -redness, -discharge  ENT: -ear pain, -nasal congestion, -sore throat  Cardiovascular: -chest pain, -palpitations, -lower extremity edema  Respiratory: -cough, -shortness of breath  Gastrointestinal: -abdominal pain, -nausea, -vomiting, -diarrhea, -constipation, -blood in stool  Genitourinary: -dysuria, -hematuria, -frequency  Musculoskeletal: -joint pain, -muscle pain  Skin: -rash, -lesion  Neurological: -headache, -dizziness, -numbness, -tingling  Psychiatric: -anxiety, -depression, -sleep difficulty               Family History:  family history includes Asthma in his brother; Cirrhosis in his father; Guillain-Tecumseh syndrome in his father; Hypertension in his father and paternal grandmother; Liver disease in his father; No Known Problems in his brother and mother.   Family  "history was reviewed with patient.     Medications:  Encounter Medications[1]    Allergies:  Review of patient's allergies indicates:   Allergen Reactions    Aspirin        Health Maintenance:  Health Maintenance   Topic Date Due    Lipid Panel  Never done    Hemoglobin A1c (Diabetic Prevention Screening)  Never done    Colorectal Cancer Screening  Never done    Hepatitis C Screening  06/13/2025 (Originally 1965)    HIV Screening  06/13/2031 (Originally 8/23/1980)    TETANUS VACCINE  03/04/2032    RSV Vaccine (Age 60+ and Pregnant patients) (1 - 1-dose 75+ series) 08/23/2042    Influenza Vaccine  Completed    Shingles Vaccine  Discontinued    COVID-19 Vaccine  Discontinued    Pneumococcal Vaccines (Age 50+)  Discontinued     Health Maintenance Topics with due status: Not Due       Topic Last Completion Date    TETANUS VACCINE 03/04/2022    RSV Vaccine (Age 60+ and Pregnant patients) Not Due       Physical Exam      Vital Signs  Pulse: 80  SpO2: 99 %  BP: 134/84  BP Location: Right arm  Patient Position: Sitting  Pain Score: 0-No pain  Height and Weight  Height: 5' 9" (175.3 cm)  Weight: 81.7 kg (180 lb 1.9 oz)  BSA (Calculated - sq m): 1.99 sq meters  BMI (Calculated): 26.6  Weight in (lb) to have BMI = 25: 168.9]    Physical Exam  Vitals reviewed.   Constitutional:       Appearance: Normal appearance. He is normal weight.   HENT:      Head: Normocephalic and atraumatic.      Right Ear: Tympanic membrane, ear canal and external ear normal.      Left Ear: Tympanic membrane, ear canal and external ear normal.      Nose: Nose normal.      Mouth/Throat:      Mouth: Mucous membranes are moist.      Pharynx: Oropharynx is clear.   Eyes:      Extraocular Movements: Extraocular movements intact.      Conjunctiva/sclera: Conjunctivae normal.      Pupils: Pupils are equal, round, and reactive to light.   Cardiovascular:      Rate and Rhythm: Normal rate and regular rhythm.      Pulses: Normal pulses.      Heart sounds: " Normal heart sounds.   Pulmonary:      Effort: Pulmonary effort is normal.      Breath sounds: Normal breath sounds.   Abdominal:      General: Abdomen is flat. Bowel sounds are normal.      Palpations: Abdomen is soft.   Musculoskeletal:         General: Normal range of motion.      Cervical back: Normal range of motion and neck supple.   Skin:     General: Skin is warm and dry.      Capillary Refill: Capillary refill takes less than 2 seconds.   Neurological:      General: No focal deficit present.      Mental Status: He is alert and oriented to person, place, and time. Mental status is at baseline.   Psychiatric:         Mood and Affect: Mood normal.         Behavior: Behavior normal.         Thought Content: Thought content normal.         Judgment: Judgment normal.            Assessment/Plan     Albert Savage Jr. is a 59 y.o.male with:    Routine medical exam  -     CBC Auto Differential; Future; Expected date: 06/13/2025  -     Comprehensive Metabolic Panel; Future; Expected date: 06/13/2025  -     Hemoglobin A1C; Future; Expected date: 06/13/2025  -     Lipid Panel; Future; Expected date: 06/13/2025  -     T4, Free; Future; Expected date: 06/13/2025  -     TSH; Future; Expected date: 06/13/2025    Multiple sclerosis  -     Ambulatory referral/consult to Internal Medicine    Prostate cancer screening  -     PSA, Screening; Future; Expected date: 06/13/2025    Colon cancer screening  -     Ambulatory referral/consult to Endo Procedure ; Future; Expected date: 06/14/2025        As above, continue current medications and maintain follow up with specialists.  Return to clinic as needed.    Greater than 50% of visit was spent face to face with patient.  All questions were answered to patient's satisfaction.          Karen L Spencer, NP-C [facility]                     [1]   Outpatient Encounter Medications as of 6/13/2025   Medication Sig Dispense Refill    glatiramer (COPAXONE, GLATOPA) 40  mg/mL injection Inject 40 mg into the skin 3 (three) times a week. 36 mL 1    multivitamin with minerals tablet Take 1 tablet by mouth once daily.      mv-mn/iron/folic acid/herb 190 (VITAMIN D3 COMPLETE ORAL) Take 5,000 Units by mouth.       No facility-administered encounter medications on file as of 6/13/2025.

## 2025-06-16 ENCOUNTER — RESULTS FOLLOW-UP (OUTPATIENT)
Dept: PRIMARY CARE CLINIC | Facility: CLINIC | Age: 60
End: 2025-06-16

## 2025-06-19 ENCOUNTER — PATIENT MESSAGE (OUTPATIENT)
Dept: PSYCHIATRY | Facility: CLINIC | Age: 60
End: 2025-06-19
Payer: COMMERCIAL

## 2025-06-24 ENCOUNTER — TELEPHONE (OUTPATIENT)
Dept: ENDOSCOPY | Facility: HOSPITAL | Age: 60
End: 2025-06-24
Payer: COMMERCIAL

## 2025-06-24 NOTE — TELEPHONE ENCOUNTER
Contacted patient to schedule colonoscopy.  Patient stated that he is not able to schedule at this time due to his work schedule.  PAT appointment made for later date.

## 2025-07-09 ENCOUNTER — TELEPHONE (OUTPATIENT)
Dept: ENDOSCOPY | Facility: HOSPITAL | Age: 60
End: 2025-07-09
Payer: COMMERCIAL

## 2025-07-09 NOTE — TELEPHONE ENCOUNTER
Patient stated that he will call us back due to his insurance he will call back when he gets a new insurance so he can schedule his colonoscopy

## 2025-07-24 DIAGNOSIS — G35 MULTIPLE SCLEROSIS: ICD-10-CM

## 2025-07-24 RX ORDER — GLATIRAMER 40 MG/ML
INJECTION, SOLUTION SUBCUTANEOUS
Qty: 36 ML | Refills: 1 | Status: SHIPPED | OUTPATIENT
Start: 2025-07-24

## 2025-07-30 ENCOUNTER — PATIENT MESSAGE (OUTPATIENT)
Dept: PSYCHIATRY | Facility: CLINIC | Age: 60
End: 2025-07-30
Payer: COMMERCIAL

## 2025-08-01 ENCOUNTER — PATIENT MESSAGE (OUTPATIENT)
Dept: PSYCHIATRY | Facility: CLINIC | Age: 60
End: 2025-08-01
Payer: COMMERCIAL

## 2025-08-18 ENCOUNTER — TELEPHONE (OUTPATIENT)
Dept: ENDOSCOPY | Facility: HOSPITAL | Age: 60
End: 2025-08-18
Payer: COMMERCIAL